# Patient Record
Sex: MALE | Race: OTHER | HISPANIC OR LATINO | Employment: FULL TIME | URBAN - METROPOLITAN AREA
[De-identification: names, ages, dates, MRNs, and addresses within clinical notes are randomized per-mention and may not be internally consistent; named-entity substitution may affect disease eponyms.]

---

## 2024-03-27 ENCOUNTER — TELEPHONE (OUTPATIENT)
Dept: NEUROLOGY | Facility: CLINIC | Age: 44
End: 2024-03-27

## 2024-03-27 NOTE — TELEPHONE ENCOUNTER
Spoke to pt, I r/s today's visit due to provider being out sick. New appoint 5/1 @ 3:30. He was placed on the wait list for any afternoon openings.

## 2024-03-27 NOTE — TELEPHONE ENCOUNTER
[facility]:    YARITZA  1/29/24  NUMBNESS AND TINGLING IN LEFT ARM/STROKE LIKE SYMPTOMS    Rescheduled patient with [facility] in [place] office 4/30/24 added to wait list      Per notes:    Recommend outpatient follow in 4-6 weeks with general or neurovascular Attending AP or resident.         Patient's significant other called was upset and stated that she was not happy that patient was rescheduled, patient has waited for a while for an appointment and wanted something sooner.  I offered new appt and she was pleased.

## 2024-03-29 DIAGNOSIS — I10 HYPERTENSION, UNSPECIFIED TYPE: ICD-10-CM

## 2024-03-29 RX ORDER — LISINOPRIL 10 MG/1
10 TABLET ORAL DAILY
Qty: 30 TABLET | Refills: 2 | Status: SHIPPED | OUTPATIENT
Start: 2024-03-29

## 2024-04-01 DIAGNOSIS — I10 HYPERTENSION, UNSPECIFIED TYPE: ICD-10-CM

## 2024-04-01 RX ORDER — LISINOPRIL 10 MG/1
10 TABLET ORAL DAILY
Qty: 30 TABLET | Refills: 2 | OUTPATIENT
Start: 2024-04-01

## 2024-04-02 ENCOUNTER — HOSPITAL ENCOUNTER (OUTPATIENT)
Dept: NEUROLOGY | Facility: CLINIC | Age: 44
Discharge: HOME | End: 2024-04-02
Payer: COMMERCIAL

## 2024-04-02 DIAGNOSIS — G62.9 POLYNEUROPATHY: Primary | ICD-10-CM

## 2024-04-02 DIAGNOSIS — G62.9 NEUROPATHY: ICD-10-CM

## 2024-04-02 PROCEDURE — 95910 NRV CNDJ TEST 7-8 STUDIES: CPT | Performed by: PSYCHIATRY & NEUROLOGY

## 2024-04-02 PROCEDURE — 95886 MUSC TEST DONE W/N TEST COMP: CPT | Performed by: PSYCHIATRY & NEUROLOGY

## 2024-04-06 ENCOUNTER — APPOINTMENT (OUTPATIENT)
Dept: LAB | Facility: HOSPITAL | Age: 44
End: 2024-04-06
Payer: COMMERCIAL

## 2024-04-06 DIAGNOSIS — G62.9 POLYNEUROPATHY: Primary | ICD-10-CM

## 2024-04-06 LAB
LDH SERPL-CCNC: 129 U/L (ref 140–271)
URATE SERPL-MCNC: 6 MG/DL (ref 3.5–8.5)

## 2024-04-06 PROCEDURE — 84550 ASSAY OF BLOOD/URIC ACID: CPT

## 2024-04-06 PROCEDURE — 36415 COLL VENOUS BLD VENIPUNCTURE: CPT

## 2024-04-06 PROCEDURE — 83521 IG LIGHT CHAINS FREE EACH: CPT

## 2024-04-06 PROCEDURE — 82175 ASSAY OF ARSENIC: CPT

## 2024-04-06 PROCEDURE — 82232 ASSAY OF BETA-2 PROTEIN: CPT

## 2024-04-06 PROCEDURE — 83615 LACTATE (LD) (LDH) ENZYME: CPT

## 2024-04-06 PROCEDURE — 82300 ASSAY OF CADMIUM: CPT

## 2024-04-06 PROCEDURE — 83655 ASSAY OF LEAD: CPT

## 2024-04-06 PROCEDURE — 83825 ASSAY OF MERCURY: CPT

## 2024-04-06 PROCEDURE — 85810 BLOOD VISCOSITY EXAMINATION: CPT

## 2024-04-08 LAB
B2 MICROGLOB SERPL-MCNC: 1.1 MG/L (ref 0.6–2.4)
KAPPA LC FREE SER-MCNC: 15.7 MG/L (ref 3.3–19.4)
KAPPA LC FREE/LAMBDA FREE SER: 0.44 {RATIO} (ref 0.26–1.65)
LAMBDA LC FREE SERPL-MCNC: 35.8 MG/L (ref 5.7–26.3)

## 2024-04-10 LAB — VISC SER: 1.7 REL.SALINE (ref 1.4–2)

## 2024-04-11 LAB
ARSENIC BLD-MCNC: 2 UG/L (ref 0–9)
CADMIUM BLD-MCNC: <0.5 UG/L (ref 0–1.2)
LEAD BLD-MCNC: <1 UG/DL (ref 0–3.4)
MERCURY BLD-MCNC: <1 UG/L (ref 0–14.9)

## 2024-04-24 ENCOUNTER — OFFICE VISIT (OUTPATIENT)
Dept: NEUROLOGY | Facility: CLINIC | Age: 44
End: 2024-04-24
Payer: COMMERCIAL

## 2024-04-24 ENCOUNTER — OFFICE VISIT (OUTPATIENT)
Dept: INTERNAL MEDICINE CLINIC | Facility: CLINIC | Age: 44
End: 2024-04-24

## 2024-04-24 VITALS
OXYGEN SATURATION: 96 % | WEIGHT: 252 LBS | BODY MASS INDEX: 36.08 KG/M2 | SYSTOLIC BLOOD PRESSURE: 131 MMHG | HEART RATE: 62 BPM | TEMPERATURE: 97.6 F | HEIGHT: 70 IN | RESPIRATION RATE: 18 BRPM | DIASTOLIC BLOOD PRESSURE: 86 MMHG

## 2024-04-24 VITALS
DIASTOLIC BLOOD PRESSURE: 90 MMHG | TEMPERATURE: 97.8 F | RESPIRATION RATE: 18 BRPM | WEIGHT: 254.3 LBS | SYSTOLIC BLOOD PRESSURE: 150 MMHG | HEIGHT: 70 IN | BODY MASS INDEX: 36.41 KG/M2 | OXYGEN SATURATION: 95 % | HEART RATE: 70 BPM

## 2024-04-24 DIAGNOSIS — R51.9 NONINTRACTABLE EPISODIC HEADACHE, UNSPECIFIED HEADACHE TYPE: ICD-10-CM

## 2024-04-24 DIAGNOSIS — G47.33 OSA (OBSTRUCTIVE SLEEP APNEA): ICD-10-CM

## 2024-04-24 DIAGNOSIS — E55.9 VITAMIN D DEFICIENCY: Primary | ICD-10-CM

## 2024-04-24 DIAGNOSIS — E66.9 OBESITY (BMI 30-39.9): ICD-10-CM

## 2024-04-24 DIAGNOSIS — G62.9 NEUROPATHY: ICD-10-CM

## 2024-04-24 DIAGNOSIS — R29.818 NEUROGENIC CLAUDICATION: Primary | ICD-10-CM

## 2024-04-24 PROCEDURE — 99215 OFFICE O/P EST HI 40 MIN: CPT | Performed by: PSYCHIATRY & NEUROLOGY

## 2024-04-24 PROCEDURE — 99214 OFFICE O/P EST MOD 30 MIN: CPT | Performed by: INTERNAL MEDICINE

## 2024-04-24 RX ORDER — GABAPENTIN 100 MG/1
100 CAPSULE ORAL 2 TIMES DAILY
Qty: 60 CAPSULE | Refills: 1 | Status: SHIPPED | OUTPATIENT
Start: 2024-04-24

## 2024-04-24 RX ORDER — METHYLPREDNISOLONE 4 MG/1
TABLET ORAL
Qty: 21 EACH | Refills: 0 | Status: SHIPPED | OUTPATIENT
Start: 2024-04-24

## 2024-04-24 RX ORDER — IBUPROFEN 400 MG/1
400 TABLET ORAL EVERY 6 HOURS PRN
Qty: 90 TABLET | Refills: 1 | Status: SHIPPED | OUTPATIENT
Start: 2024-04-24

## 2024-04-24 NOTE — PROGRESS NOTES
Name: Portillo Machado      : 1980      MRN: [id number]  Encounter Provider: Nikolas Arnold DO  Encounter Date: 2024   Encounter department: [facility]    Assessment & Plan     1. Vitamin D deficiency  -     cholecalciferol 1,000 units tablet; Take 1 tablet (1,000 Units total) by mouth daily    2. Neuropathy  -     gabapentin (NEURONTIN) 100 mg capsule; Take 1 capsule (100 mg total) by mouth 2 (two) times a day    3. Nonintractable episodic headache, unspecified headache type  -     methylPREDNISolone 4 MG tablet therapy pack; Use as directed on package  -     ibuprofen (MOTRIN) 400 mg tablet; Take 1 tablet (400 mg total) by mouth every 6 (six) hours as needed for mild pain or headaches    Neuropathy -at this point we have ruled out organic causes such as nutritional deficiencies, EMG negative, brain MRI negative, initial workup for autoimmune process negative.  Undergoing further workup by neurology including MRI L-spine.  Increase gabapentin from 100 nightly to 100 twice daily  Hypertension-continue lisinopril 10 mg daily.  Blood pressure adequately controlled today.  Headaches with right eye pain -differential includes migraine versus cluster headache however patient denies symptoms of rhinorrhea, watery eyes.  We will trial a Medrol Dosepak as well as prescribed NSAIDs as needed  Vitamin D deficiency -status post catch-up therapy with vitamin D2 50,000 units/week.  Switch over to vitamin D3 1000 units daily.  Follow-up in 2 to 3 months after MRI L-spine and visit with neurology     Subjective     43-year-old with VENKAT, obesity, right ankle stretch fractures in , history of hypertension presenting for a 8-week follow-up.    Summary of last visit with updates to HPI: Has been experiencing burning pain and needle sensation of bilateral legs and feet for the past 6 months.  Symptoms are worse with physical exertion.  More recently this has started to  include his bilateral upper extremities mainly his hands.  Thorough workup was performed including EMG, testing for toxic heavy metals, autoimmune screening, vitamin levels.  Workup has resulted in minor increases in ESR 17, CRP 4.5, low vitamin D 9.4.  Also had MRI brain and EMG which were both within normal limits.    Patient continues to endorse similar symptoms at this visit.  New symptoms at this time include Right eye pain and some vision changes as well with long distance vision   This has been going on for 3 works. Does not wear contacts. Symptoms are worse with bright lights. Symptoms can occur randomly through out the day. BAUGH also strated about three weeks. They are right sided extending from the right temple to the right occiput.  Denies rhinorrhea or excessive tearing. Denies N/V. No additional focal sx     Patient seen by neurology today and a MRI lumbar spine was ordered to rule out a neurogenic cause of his symptoms.    Patient was prescribed gabapentin 100 mg at bedtime and states that this does help his symptoms at nighttime and he is able to sleep throughout the night.  He is agreeable to try this medication during the day.  We will upgrade his gabapentin from 100 mg nightly to 100 mg twice daily.  Denies operating heavy machinery at work.    Review of Systems   Constitutional:  Negative for chills and fever.   HENT:  Negative for ear pain and sore throat.    Eyes:  Positive for photophobia, pain and visual disturbance. Negative for redness.   Respiratory:  Negative for cough and shortness of breath.    Cardiovascular:  Negative for chest pain and palpitations.   Gastrointestinal:  Negative for abdominal pain and vomiting.   Genitourinary:  Negative for dysuria and hematuria.   Musculoskeletal:  Negative for arthralgias and back pain.   Skin:  Negative for color change and rash.   Neurological:  Positive for headaches. Negative for seizures and syncope.   All other systems reviewed and are  "negative.      Current Outpatient Medications on File Prior to Visit   Medication Sig    Diclofenac Sodium (VOLTAREN) 1 % Apply 2 g topically 4 (four) times a day (Patient not taking: Reported on 4/24/2024)    ergocalciferol (VITAMIN D2) 50,000 units Take 1 capsule (50,000 Units total) by mouth once a week for 8 doses    lisinopril (ZESTRIL) 10 mg tablet TAKE 1 TABLET(10 MG) BY MOUTH DAILY    pravastatin (PRAVACHOL) 20 mg tablet Take 1 tablet (20 mg total) by mouth daily    vitamin B-12 (VITAMIN B-12) 1,000 mcg tablet Take 1 tablet (1,000 mcg total) by mouth daily    [DISCONTINUED] gabapentin (NEURONTIN) 100 mg capsule Take 1 capsule (100 mg total) by mouth daily at bedtime       Objective     /86   Pulse 62   Temp 97.6 °F (36.4 °C) (Temporal)   Resp 18   Ht 5' 10\" (1.778 m)   Wt 114 kg (252 lb)   SpO2 96%   BMI 36.16 kg/m²     Physical Exam  Constitutional:       Appearance: He is obese.   HENT:      Head: Normocephalic and atraumatic.      Mouth/Throat:      Mouth: Mucous membranes are moist.   Eyes:      Extraocular Movements: Extraocular movements intact.      Pupils: Pupils are equal, round, and reactive to light.   Cardiovascular:      Rate and Rhythm: Normal rate and regular rhythm.      Pulses: Normal pulses.   Abdominal:      General: Bowel sounds are normal. There is no distension.      Palpations: Abdomen is soft.      Tenderness: There is no abdominal tenderness.   Musculoskeletal:      Right lower leg: No edema.      Left lower leg: No edema.   Skin:     General: Skin is warm.      Findings: No rash.   Neurological:      Mental Status: He is alert and oriented to person, place, and time.      Motor: No weakness.       Nikolas Arnold,     "

## 2024-04-24 NOTE — ASSESSMENT & PLAN NOTE
Patient with history of concussion with post traumatic headache, hypertension, R ankle fracture, Hyperlipidemia, VENKAT (Not on CPAP), fracture of the left radius, venous insufficiency and excessive daytime sleepiness who presents for evaluation of neuropathy.    Patient reports reports that symptom onset was 2 years ago.  It started with his medial right leg and was told it was a bony abnormality.  He had an AFO placed which helped improve symptoms.  After a year he started to experience paresthesias of both feet.  He reports having a heavy pain on the medial side of his distal right lower extremity and on his lateral side of his left lower extremity.  This symptom ascends up to below the knee.  Symptoms are worse with standing and walking long distances.  In the mornings he has significant difficulty putting his feet on the ground because that is when he feels the pain the most.  At bedtime he mostly feels a stinging like sensation in his extremities.  In the past patient used to smoke for 15 years 1 pack/day but quit.    On physical exam today patient with decreased strength at bilateral hamstrings and right plantar extension.  He does have decreased range of movement at the ankles.  He has multiple sensory abnormalities with decreased vibration sense at the toes bilaterally and mid shin changing temperature sensation and diminished pinprick sensation of the right foot below the ankle.  Patient with bilateral suprapatellar reflexes present and a right cross adductor present.  Patient has an antalgic gait.  Available workup thus far including an EMG of bilateral lower extremities and a BALs were normal.    At this time I believe that patient likely has neurogenic claudication from lumbar spinal stenosis given his history, weight and exam.  I will do an MRI of the L-spine to evaluate for th.is.  Pending results will refer to neurosurgery.  Referral also placed for weight management to help with weight loss.  I advised  patient to pursue obtaining a CPAP machine as this could contribute to weight gain if left untreated.  Follow-up after MRI results

## 2024-04-24 NOTE — PROGRESS NOTES
Assessment/Plan:    No problem-specific Assessment & Plan notes found for this encounter.       {Assess/PlanSmartLinks:10896}      Subjective:      Patient ID: Portillo Machado is a 43 y.o. male.    HPI    {Common ambulatory SmartLinks:81860}    Review of Systems      Objective:      There were no vitals taken for this visit.         Physical Exam

## 2024-04-24 NOTE — PROGRESS NOTES
Patient ID: Portillo Machado is a 43 y.o. male.    Assessment/Plan:    Neurogenic claudication  Patient with history of concussion with post traumatic headache, hypertension, R ankle fracture, Hyperlipidemia, VENKAT (Not on CPAP), fracture of the left radius, venous insufficiency and excessive daytime sleepiness who presents for evaluation of neuropathy.    Patient reports reports that symptom onset was 2 years ago.  It started with his medial right leg and was told it was a bony abnormality.  He had an AFO placed which helped improve symptoms.  After a year he started to experience paresthesias of both feet.  He reports having a heavy pain on the medial side of his distal right lower extremity and on his lateral side of his left lower extremity.  This symptom ascends up to below the knee.  Symptoms are worse with standing and walking long distances.  In the mornings he has significant difficulty putting his feet on the ground because that is when he feels the pain the most.  At bedtime he mostly feels a stinging like sensation in his extremities.  In the past patient used to smoke for 15 years 1 pack/day but quit.    On physical exam today patient with decreased strength at bilateral hamstrings and right plantar extension.  He does have decreased range of movement at the ankles.  He has multiple sensory abnormalities with decreased vibration sense at the toes bilaterally and mid shin changing temperature sensation and diminished pinprick sensation of the right foot below the ankle.  Patient with bilateral suprapatellar reflexes present and a right cross adductor present.  Patient has an antalgic gait.  Available workup thus far including an EMG of bilateral lower extremities and a BALs were normal.    At this time I believe that patient likely has neurogenic claudication from lumbar spinal stenosis given his history, weight and exam.  I will do an MRI of the L-spine to evaluate for th.is.  Pending results will  refer to neurosurgery.  Referral also placed for weight management to help with weight loss.  I advised patient to pursue obtaining a CPAP machine as this could contribute to weight gain if left untreated.  Follow-up after MRI results         Diagnoses and all orders for this visit:    Neurogenic claudication  -     MRI lumbar spine without contrast; Future  -     Ambulatory Referral to Weight Management; Future    Obesity (BMI 30-39.9)  -     Ambulatory Referral to Weight Management; Future    VENKAT (obstructive sleep apnea)       Subjective:    HPI    Patient with history of concussion with post traumatic headache, hypertension, R ankle fracture, Hyperlipidemia, VENKAT (Not on CPAP), fracture of the left radius, venous insufficiency and excessive daytime sleepiness who presents for evaluation of neuropathy    Patient presents for evaluation of neuropathy  Started 2 months ago. Sx started with the right leg, was told it was a bony abnormality. Had a boot then improved. Then a year after both legs occurs  Symmetric both feet   Top of feet, R medial side and L foot lateral side ascends up to below the knee  Reports a heavy pain, at night is a sting   Worse with walking long distances.   No inciting event   Has leg swelling   When he wakes up in the morning he has difficulty putting his feet on the ground cause that is when he feels the pain most   Feels weakness of the legs       Quit smoking. Smoked for 15 years 1 PPD     Headaches:  Started 3 weeks ago   Usually right retro orbital and radiates posteriorly   Lasts 10 min- 12 H   Has muscle neck tension.   A few weeks ago had a left occipital pain with anterior L throat pain.         Workup:  EMG 4/2/24: normal     VAS 2022:  CONCLUSION:  Impression:  RIGHT LOWER LIMB:  No evidence of significant lower extremity arterial occlusive disease.  Ankle/Brachial index:  1.13 within normal limits  PVR/ PPG tracings are normal.  Metatarsal pressure of 156 mmHg  Great toe pressure  "of 133 mmHg, within the healing range        LEFT LOWER LIMB:  No evidence of significant lower extremity arterial occlusive disease.  Ankle/Brachial index:   1.11 within normal limits  PVR/ PPG tracings are normal.  Metatarsal pressure of 155 mmHg  Great toe pressure of 169 mmHg, within the healing range          The following portions of the patient's history were reviewed and updated as appropriate: allergies, current medications, past family history, past medical history, past social history, past surgical history, and problem list.         Objective:    Blood pressure 150/90, pulse 70, temperature 97.8 °F (36.6 °C), temperature source Temporal, resp. rate 18, height 5' 10\" (1.778 m), weight 115 kg (254 lb 4.8 oz), SpO2 95%.    Physical Exam  Constitutional:       General: He is awake.   Eyes:      General: Lids are normal.      Extraocular Movements: Extraocular movements intact.   Neurological:      Coordination: Romberg sign negative.      Deep Tendon Reflexes:      Reflex Scores:       Bicep reflexes are 2+ on the right side and 2+ on the left side.       Brachioradialis reflexes are 2+ on the right side and 2+ on the left side.       Patellar reflexes are 2+ on the right side and 2+ on the left side.       Achilles reflexes are 1+ on the right side and 1+ on the left side.  Psychiatric:         Speech: Speech normal.         Neurological Exam  Mental Status  Awake. Speech is normal. Language is fluent with no aphasia. Attention and concentration are normal.    Cranial Nerves  CN III, IV, VI: Extraocular movements intact bilaterally. Normal lids and orbits bilaterally.  CN VII: Full and symmetric facial movement.  CN XII: Tongue midline without atrophy or fasciculations.  Right sensorineural hearing loss .    Motor  Increased muscle bulk throughout. No fasciculations present. Normal muscle tone. No abnormal involuntary movements. Strength is 5/5 in all four extremities except as noted.  Decreased ROM " at the ankle   4+/5 bilateral hamstring  4+/5 Right plantar extension .    Sensory  Pinprick abnormality: Temperature abnormality: Vibration abnormality:   Normal proprioception at the toes bilaterally   Diminished pinprick sensation Right foot below ankle   Mid shin change in temp sensation   Decreased vibraiton sense at toes bilaterally   .    Reflexes                                            Right                      Left  Brachioradialis                    2+                         2+  Biceps                                 2+                         2+  Patellar                                2+                         2+  Achilles                                1+                         1+  Right Plantar: downgoing  Left Plantar: downgoing    Right pathological reflexes: Javi's absent. Suprapatellar present. Crossed adductor present. Ankle clonus absent.  Left pathological reflexes: Javi's absent. Suprapatellar present. Crossed adductor absent. Ankle clonus absent.  Brisk patellars.    Gait  Casual gait: Antalgic gait. Romberg is absent.        ROS:    Review of Systems  Review of Systems   Constitutional:  Negative for appetite change, fatigue and fever.   HENT: Negative.  Negative for hearing loss, tinnitus, trouble swallowing and voice change.    Eyes:  Positive for pain (right eye). Negative for photophobia and visual disturbance.   Respiratory: Negative.  Negative for shortness of breath.    Cardiovascular: Negative.  Negative for palpitations.   Gastrointestinal: Negative.  Negative for nausea and vomiting.   Endocrine: Negative.  Negative for cold intolerance.   Genitourinary: Negative.  Negative for dysuria, frequency and urgency.   Musculoskeletal:  Positive for gait problem (due to leg numbness). Negative for back pain, myalgias, neck pain and neck stiffness.   Skin: Negative.  Negative for rash.   Allergic/Immunologic: Negative.    Neurological:  Positive for weakness (BL legs),  numbness and headaches (right side - daily/constant). Negative for dizziness, tremors, seizures, syncope, facial asymmetry, speech difficulty and light-headedness (BL legs - pain/tingling at times and a burning sensation).        A lot of cramping on BL legs    Hematological: Negative.  Does not bruise/bleed easily.   Psychiatric/Behavioral: Negative.  Negative for confusion, hallucinations and sleep disturbance.    All other systems reviewed and are negative.

## 2024-05-03 ENCOUNTER — HOSPITAL ENCOUNTER (OUTPATIENT)
Dept: MRI IMAGING | Facility: HOSPITAL | Age: 44
Discharge: HOME | End: 2024-05-03
Attending: PSYCHIATRY & NEUROLOGY
Payer: COMMERCIAL

## 2024-05-03 DIAGNOSIS — R29.818 NEUROGENIC CLAUDICATION: ICD-10-CM

## 2024-05-03 PROCEDURE — 72148 MRI LUMBAR SPINE W/O DYE: CPT

## 2024-05-22 DIAGNOSIS — R29.818 NEUROGENIC CLAUDICATION: Primary | ICD-10-CM

## 2024-05-22 DIAGNOSIS — M43.17 SPONDYLOLISTHESIS AT L5-S1 LEVEL: ICD-10-CM

## 2024-06-04 ENCOUNTER — HOSPITAL ENCOUNTER (EMERGENCY)
Facility: HOSPITAL | Age: 44
Discharge: HOME | End: 2024-06-04
Attending: EMERGENCY MEDICINE
Payer: COMMERCIAL

## 2024-06-04 VITALS
OXYGEN SATURATION: 98 % | HEIGHT: 70 IN | BODY MASS INDEX: 35.66 KG/M2 | DIASTOLIC BLOOD PRESSURE: 70 MMHG | RESPIRATION RATE: 14 BRPM | TEMPERATURE: 97.2 F | SYSTOLIC BLOOD PRESSURE: 127 MMHG | WEIGHT: 249.12 LBS | HEART RATE: 71 BPM

## 2024-06-04 DIAGNOSIS — R51.9 HEADACHE: Primary | ICD-10-CM

## 2024-06-04 PROCEDURE — 99284 EMERGENCY DEPT VISIT MOD MDM: CPT

## 2024-06-04 PROCEDURE — 99283 EMERGENCY DEPT VISIT LOW MDM: CPT

## 2024-06-04 RX ORDER — METOCLOPRAMIDE 10 MG/1
10 TABLET ORAL ONCE
Status: COMPLETED | OUTPATIENT
Start: 2024-06-04 | End: 2024-06-04

## 2024-06-04 RX ORDER — ACETAMINOPHEN 500 MG
500 TABLET ORAL EVERY 6 HOURS PRN
Qty: 30 TABLET | Refills: 0 | Status: SHIPPED | OUTPATIENT
Start: 2024-06-04

## 2024-06-04 RX ORDER — IBUPROFEN 600 MG/1
600 TABLET ORAL ONCE
Status: COMPLETED | OUTPATIENT
Start: 2024-06-04 | End: 2024-06-04

## 2024-06-04 RX ORDER — IBUPROFEN 800 MG/1
800 TABLET ORAL 3 TIMES DAILY
Qty: 21 TABLET | Refills: 0 | Status: SHIPPED | OUTPATIENT
Start: 2024-06-04

## 2024-06-04 RX ORDER — ACETAMINOPHEN 325 MG/1
975 TABLET ORAL ONCE
Status: COMPLETED | OUTPATIENT
Start: 2024-06-04 | End: 2024-06-04

## 2024-06-04 RX ORDER — KETOROLAC TROMETHAMINE 10 MG/1
10 TABLET, FILM COATED ORAL ONCE
Status: DISCONTINUED | OUTPATIENT
Start: 2024-06-04 | End: 2024-06-04

## 2024-06-04 RX ADMIN — IBUPROFEN 600 MG: 600 TABLET, FILM COATED ORAL at 11:09

## 2024-06-04 RX ADMIN — ACETAMINOPHEN 325MG 975 MG: 325 TABLET ORAL at 11:09

## 2024-06-04 RX ADMIN — METOCLOPRAMIDE 10 MG: 10 TABLET ORAL at 11:09

## 2024-06-04 NOTE — ED PROVIDER NOTES
History  Chief Complaint   Patient presents with    Headache     Patient reports right sided posterior pain x 3 weeks, denies head injury or migraine like symptoms     Patient is a 43-year-old male presents to the emergency department with right-sided posterior head pain that started 3 weeks ago.  Patient denies any head injury or history of trauma.  Patient states the pain is a throbbing pain that starts at the base of the right side of his head and travels forward to behind the eye.  Describes the pain as pulsatile but denies any obvious visual changes prior to headache onset.  Patient states that the pain has been waxing and waning for the past 3 weeks stating that the pain increases and decreases throughout the day.  Patient has taken over-the-counter Tylenol and ibuprofen for pain.  Patient states that the pain does improve with OTC pain medications but reoccurs after 1 to 2 hours after taking medication.  Patient denies any photophobia or phonophobia.  Denies any fever, body aches, chills, sinus congestion, weakness, double vision, numbness, or paresthesias.        Prior to Admission Medications   Prescriptions Last Dose Informant Patient Reported? Taking?   Diclofenac Sodium (VOLTAREN) 1 %  Self No No   Sig: Apply 2 g topically 4 (four) times a day   Patient not taking: Reported on 4/24/2024   cholecalciferol 1,000 units tablet   No No   Sig: Take 1 tablet (1,000 Units total) by mouth daily   ergocalciferol (VITAMIN D2) 50,000 units  Self No No   Sig: Take 1 capsule (50,000 Units total) by mouth once a week for 8 doses   gabapentin (NEURONTIN) 100 mg capsule   No No   Sig: Take 1 capsule (100 mg total) by mouth 2 (two) times a day   ibuprofen (MOTRIN) 400 mg tablet   No No   Sig: Take 1 tablet (400 mg total) by mouth every 6 (six) hours as needed for mild pain or headaches   lisinopril (ZESTRIL) 10 mg tablet  Self No No   Sig: TAKE 1 TABLET(10 MG) BY MOUTH DAILY   methylPREDNISolone 4 MG tablet therapy pack    No No   Sig: Use as directed on package   pravastatin (PRAVACHOL) 20 mg tablet  Self No No   Sig: Take 1 tablet (20 mg total) by mouth daily   vitamin B-12 (VITAMIN B-12) 1,000 mcg tablet  Self No No   Sig: Take 1 tablet (1,000 mcg total) by mouth daily      Facility-Administered Medications: None       Past Medical History:   Diagnosis Date    Concussion     No known problems        History reviewed. No pertinent surgical history.    Family History   Problem Relation Age of Onset    Hypertension Mother     No Known Problems Father     No Known Problems Sister     No Known Problems Brother     No Known Problems Son     No Known Problems Daughter      I have reviewed and agree with the history as documented.    E-Cigarette/Vaping    E-Cigarette Use Never User      E-Cigarette/Vaping Substances    Nicotine No     THC No     CBD No     Flavoring No     Other No     Unknown No      Social History     Tobacco Use    Smoking status: Former     Current packs/day: 0.00     Average packs/day: 1 pack/day for 15.0 years (15.0 ttl pk-yrs)     Types: Cigarettes     Start date:      Quit date: 2017     Years since quittin.4    Smokeless tobacco: Never   Vaping Use    Vaping status: Never Used   Substance Use Topics    Alcohol use: Yes     Comment: weekends only and half a pack    Drug use: No       Review of Systems   Constitutional:  Negative for activity change, appetite change, chills, diaphoresis, fatigue and fever.   HENT:  Negative for congestion, dental problem, ear discharge, ear pain, hearing loss, mouth sores, postnasal drip, rhinorrhea, sinus pressure, sinus pain, sneezing and sore throat.    Eyes:  Negative for pain, discharge, redness and itching.   Respiratory:  Negative for cough, choking, chest tightness, shortness of breath, wheezing and stridor.    Cardiovascular:  Negative for chest pain, palpitations and leg swelling.   Gastrointestinal:  Negative for abdominal pain, constipation, diarrhea, nausea and  vomiting.   Musculoskeletal:  Negative for arthralgias, back pain, gait problem, joint swelling, myalgias, neck pain and neck stiffness.   Skin:  Negative for color change, pallor, rash and wound.   Neurological:  Positive for headaches. Negative for dizziness, tremors, seizures, syncope, facial asymmetry, speech difficulty, weakness, light-headedness and numbness.       Physical Exam  Physical Exam  Constitutional:       General: He is not in acute distress.     Appearance: Normal appearance. He is not ill-appearing, toxic-appearing or diaphoretic.   HENT:      Head: Normocephalic and atraumatic.      Right Ear: Tympanic membrane, ear canal and external ear normal. There is no impacted cerumen.      Left Ear: Tympanic membrane, ear canal and external ear normal. There is no impacted cerumen.      Nose: Nose normal. No congestion or rhinorrhea.      Mouth/Throat:      Pharynx: No oropharyngeal exudate or posterior oropharyngeal erythema.   Eyes:      General: No scleral icterus.        Right eye: No discharge.         Left eye: No discharge.      Extraocular Movements: Extraocular movements intact.      Pupils: Pupils are equal, round, and reactive to light.   Neck:      Vascular: No carotid bruit.   Cardiovascular:      Rate and Rhythm: Normal rate and regular rhythm.      Heart sounds: No murmur heard.     No friction rub. No gallop.   Pulmonary:      Effort: Pulmonary effort is normal. No respiratory distress.      Breath sounds: Normal breath sounds. No stridor. No wheezing, rhonchi or rales.   Chest:      Chest wall: No tenderness.   Abdominal:      General: Abdomen is flat. There is no distension.      Palpations: Abdomen is soft.      Tenderness: There is no abdominal tenderness. There is no right CVA tenderness or guarding.   Musculoskeletal:         General: No swelling, tenderness, deformity or signs of injury.      Cervical back: No rigidity or tenderness.      Right lower leg: No edema.      Left lower  leg: No edema.   Lymphadenopathy:      Cervical: No cervical adenopathy.   Skin:     General: Skin is warm and dry.      Capillary Refill: Capillary refill takes less than 2 seconds.      Coloration: Skin is not jaundiced or pale.      Findings: No bruising, erythema, lesion or rash.   Neurological:      General: No focal deficit present.      Mental Status: He is alert and oriented to person, place, and time.      Cranial Nerves: No cranial nerve deficit.      Sensory: No sensory deficit.      Motor: No weakness.      Coordination: Coordination normal.      Gait: Gait normal.         Vital Signs  ED Triage Vitals [06/04/24 1026]   Temperature Pulse Respirations Blood Pressure SpO2   (!) 97.2 °F (36.2 °C) 71 14 127/70 98 %      Temp Source Heart Rate Source Patient Position - Orthostatic VS BP Location FiO2 (%)   Temporal -- -- -- --      Pain Score       6           Vitals:    06/04/24 1026   BP: 127/70   Pulse: 71         Visual Acuity  Visual Acuity      Flowsheet Row Most Recent Value   L Pupil Size (mm) 4   R Pupil Size (mm) 4            ED Medications  Medications   metoclopramide (REGLAN) tablet 10 mg (10 mg Oral Given 6/4/24 1109)   acetaminophen (TYLENOL) tablet 975 mg (975 mg Oral Given 6/4/24 1109)   ibuprofen (MOTRIN) tablet 600 mg (600 mg Oral Given 6/4/24 1109)       Diagnostic Studies  Results Reviewed       None                   No orders to display              Procedures  Procedures         ED Course                               SBIRT 20yo+      Flowsheet Row Most Recent Value   Initial Alcohol Screen: US AUDIT-C     1. How often do you have a drink containing alcohol? 0 Filed at: 06/04/2024 1025   2. How many drinks containing alcohol do you have on a typical day you are drinking?  0 Filed at: 06/04/2024 1025   3a. Male UNDER 65: How often do you have five or more drinks on one occasion? 0 Filed at: 06/04/2024 1025   3b. FEMALE Any Age, or MALE 65+: How often do you have 4 or more drinks on one  occassion? 0 Filed at: 06/04/2024 1025   Audit-C Score 0 Filed at: 06/04/2024 1025   SAMMIE: How many times in the past year have you...    Used an illegal drug or used a prescription medication for non-medical reasons? Never Filed at: 06/04/2024 1025                      Medical Decision Making  Patient is a 43-year-old male presents to the emergency department with right-sided posterior head pain that started 3 weeks ago.  Patient denies any head injury or history of trauma.  Patient states the pain is a throbbing pain that starts at the base of the right side of his head and travels forward to behind the eye.  Describes the pain as pulsatile but denies any obvious visual changes prior to headache onset.  Patient states that the pain has been waxing and waning for the past 3 weeks stating that the pain increases and decreases throughout the day. Physical exam showed no weakness, sensory, cranial nerve deficit, patient AO X3.  Discussed with patient about potential for Toradol for pain management and abortive therapy for headache shared decision making patient was afraid of needles discussed changing to p.o. formulation of the medication such as Tylenol and Motrin.  Upon administration of medication patient had total improvement of pain on right side of head without relapse of pain.  Discussed with patient about following up with neurology/PCP for further management of migraine headaches.  Patient agrees with current plan.    Risk  OTC drugs.  Prescription drug management.             Disposition  Final diagnoses:   Headache     Time reflects when diagnosis was documented in both MDM as applicable and the Disposition within this note       Time User Action Codes Description Comment    6/4/2024 11:23 AM Bjorn Sylvester Add [R51.9] Headache           ED Disposition       ED Disposition   Discharge    Condition   Stable    Date/Time   Tue Jun 4, 2024 1130    Comment   Efra Machado discharge to home/self  care.                   Follow-up Information    None         Discharge Medication List as of 6/4/2024 11:31 AM        START taking these medications    Details   acetaminophen (TYLENOL) 500 mg tablet Take 1 tablet (500 mg total) by mouth every 6 (six) hours as needed for mild pain, Starting Tue 6/4/2024, Normal      !! ibuprofen (MOTRIN) 800 mg tablet Take 1 tablet (800 mg total) by mouth 3 (three) times a day, Starting Tue 6/4/2024, Normal       !! - Potential duplicate medications found. Please discuss with provider.        CONTINUE these medications which have NOT CHANGED    Details   cholecalciferol 1,000 units tablet Take 1 tablet (1,000 Units total) by mouth daily, Starting Wed 4/24/2024, Normal      Diclofenac Sodium (VOLTAREN) 1 % Apply 2 g topically 4 (four) times a day, Starting Fri 2/16/2024, Normal      ergocalciferol (VITAMIN D2) 50,000 units Take 1 capsule (50,000 Units total) by mouth once a week for 8 doses, Starting Mon 2/26/2024, Until Wed 4/24/2024, Normal      gabapentin (NEURONTIN) 100 mg capsule Take 1 capsule (100 mg total) by mouth 2 (two) times a day, Starting Wed 4/24/2024, Normal      !! ibuprofen (MOTRIN) 400 mg tablet Take 1 tablet (400 mg total) by mouth every 6 (six) hours as needed for mild pain or headaches, Starting Wed 4/24/2024, Normal      lisinopril (ZESTRIL) 10 mg tablet TAKE 1 TABLET(10 MG) BY MOUTH DAILY, Starting Fri 3/29/2024, Normal      methylPREDNISolone 4 MG tablet therapy pack Use as directed on package, Normal      pravastatin (PRAVACHOL) 20 mg tablet Take 1 tablet (20 mg total) by mouth daily, Starting Mon 2/26/2024, Normal      vitamin B-12 (VITAMIN B-12) 1,000 mcg tablet Take 1 tablet (1,000 mcg total) by mouth daily, Starting Wed 3/6/2024, Until Tue 6/4/2024, Normal       !! - Potential duplicate medications found. Please discuss with provider.              PDMP Review       None            ED Provider  Electronically Signed by             Bjorn Sylvester  MINDA  06/04/24 2615

## 2024-06-04 NOTE — DISCHARGE INSTRUCTIONS
Medication as prescribed  Avoid inciting triggers of headache  Follow-up with neurology in outpatient setting for further management of migraines/headaches.  Return to emergency department if headache symptoms worsen or new symptoms develop such as sided weakness, slurring of speech.

## 2024-06-05 ENCOUNTER — OFFICE VISIT (OUTPATIENT)
Dept: INTERNAL MEDICINE CLINIC | Facility: CLINIC | Age: 44
End: 2024-06-05

## 2024-06-05 VITALS
HEART RATE: 76 BPM | BODY MASS INDEX: 35.76 KG/M2 | WEIGHT: 249.2 LBS | SYSTOLIC BLOOD PRESSURE: 136 MMHG | TEMPERATURE: 98.6 F | OXYGEN SATURATION: 98 % | DIASTOLIC BLOOD PRESSURE: 88 MMHG

## 2024-06-05 DIAGNOSIS — G44.011 INTRACTABLE EPISODIC CLUSTER HEADACHE: Primary | ICD-10-CM

## 2024-06-05 DIAGNOSIS — R51.9 NONINTRACTABLE EPISODIC HEADACHE, UNSPECIFIED HEADACHE TYPE: ICD-10-CM

## 2024-06-05 DIAGNOSIS — M62.838 NECK MUSCLE SPASM: ICD-10-CM

## 2024-06-05 PROCEDURE — 99213 OFFICE O/P EST LOW 20 MIN: CPT | Performed by: INTERNAL MEDICINE

## 2024-06-05 RX ORDER — METHYLPREDNISOLONE 4 MG/1
TABLET ORAL
Qty: 21 EACH | Refills: 0 | Status: SHIPPED | OUTPATIENT
Start: 2024-06-05

## 2024-06-05 RX ORDER — SUMATRIPTAN 20 MG/1
1 SPRAY NASAL ONCE AS NEEDED
Qty: 6 EACH | Refills: 0 | Status: SHIPPED | OUTPATIENT
Start: 2024-06-05

## 2024-06-05 RX ORDER — VERAPAMIL HYDROCHLORIDE 80 MG/1
80 TABLET ORAL 3 TIMES DAILY
Qty: 180 TABLET | Refills: 0 | Status: SHIPPED | OUTPATIENT
Start: 2024-06-05 | End: 2024-08-04

## 2024-06-05 RX ORDER — METHOCARBAMOL 500 MG/1
500 TABLET, FILM COATED ORAL 2 TIMES DAILY PRN
Qty: 28 TABLET | Refills: 0 | Status: SHIPPED | OUTPATIENT
Start: 2024-06-05

## 2024-06-05 NOTE — PATIENT INSTRUCTIONS
Isatu mitad de la cafe y desayune   Espray intranasal sumatriptan -- usar solo cuando tiene dolor mike en la natriz izquierda (otro lado del dolor de jonathan)  Verapamil 80 mg steven veces al kenn  Medrol dose pack (steroid)  Robaxin para relajante muscular por la noche  Nos vemos en 4 semanas

## 2024-06-05 NOTE — PROGRESS NOTES
INTERNAL MEDICINE OFFICE VISIT  [address] [address]    NAME: Efra Machado  AGE: 43 y.o. SEX: male    DATE OF ENCOUNTER: 6/5/2024    Assessment and Plan     1. Nonintractable episodic headache, unspecified headache type  Cluster headache ipsilateral R sided every time -- lasts 20-60 min. Last time, forgot that he was prescribed medrol and never tried it.   - methylPREDNISolone 4 MG tablet therapy pack; Use as directed on package  Dispense: 21 each; Refill: 0    2. Intractable episodic cluster headache  Start verapamil 80 mg TID, stop lisinopril. Discussed using sumatriptan intranasally only if intractable headache -- use on  nostril contralateral to headache.   MRI brain WNL. Prior imaging normal. 2018 did have a headstrike but no imaging changes.   3 weeks of headache in April 2024 and May/June 2024 each.   Follow up with neurology on 6/24.  - verapamil (CALAN) 80 mg tablet; Take 1 tablet (80 mg total) by mouth 3 (three) times a day  Dispense: 180 tablet; Refill: 0  - SUMAtriptan (IMITREX) 20 MG/ACT nasal spray; 1 spray (20 mg total) into each nostril once as needed for migraine for up to 6 doses ; if headache returns/persists, may repeat dose once after 2 hours; MAX 40 mg/24 hours  Dispense: 6 each; Refill: 0    3. Neck muscle spasm  Occiput with point tenderness on R near nape of neck. Trial robaxin 500 mg at night PRN. Watch for drowsiness  - methocarbamol (ROBAXIN) 500 mg tablet; Take 1 tablet (500 mg total) by mouth 2 (two) times a day as needed for muscle spasms Por la noche  Dispense: 28 tablet; Refill: 0    No orders of the defined types were placed in this encounter.    Chief Complaint     Chief Complaint   Patient presents with   • Headache     Right side only, 3 weeks, comes and goes throughout the day,       History of Present Illness     HPI  4/2024 - he had pain in R eye and had some vision changes at that time. MRI brain and EMG were normal. He  "saw ophthalmologist 2 weeks ago, and had some worsening vision, and is waiting for new glasses. The headache was gone therafter.     Now, 3 weeks ago, started a new headahce that was diffferent starting from the nape of his neck near right occiput sharp pain that radiated from right eye brow. The headache can last 20 min - 1 hour. 3-4x a day. Most of the pain when he wakes up in the morning. He took aspirin from ED which helped him. Yesterday he woke up fine but while elaving the house later morning, the pains started again. In AM, around 6 am when pain started -- he tries to eat breakfast and eat 2 advils -- which relieves the pain for 2-3 hours then pain comes back. When it comes back, he tries to tough through the pain without meds. He has not tried tylenol. He endorses numbness on R scalp when pain starts up, no parasthesia, no crying from one eye. Ear sometimes feels blocked on some occasions -- he heard tinnitus \"beep\" only R ear once. No photophobia no phonophobia.   No seasonal allergies right now.   5 years ago, fell on ice and hit occiput of head and neck -- but everything came out normal. He was told h  No fevers, chills, weight loss. He did have once night sweat on Monday. No family history of aneurysm or brain issues, migraine, or autoimmune disease.  Sister did have hemiparesis in past due to high blood pressure -- at 38 -- unsure if stroke.     The following portions of the patient's history were reviewed and updated as appropriate: allergies, current medications, past family history, past medical history, past social history, past surgical history and problem list.    Review of Systems     Review of Systems   Constitutional:  Negative for chills and fever.   HENT:  Negative for ear pain, rhinorrhea and sore throat.    Eyes:  Negative for photophobia, pain, discharge, redness and visual disturbance.   Gastrointestinal:  Negative for nausea and vomiting.   Endocrine: Negative.    Genitourinary: " Negative.    Musculoskeletal:  Negative for arthralgias and back pain.   Skin:  Negative for rash.   Neurological:  Positive for headaches. Negative for dizziness, tremors, seizures, syncope, facial asymmetry, speech difficulty, weakness, light-headedness and numbness.   All other systems reviewed and are negative.      Active Problem List     Patient Active Problem List   Diagnosis   • Concussion with no loss of consciousness   • Convergence insufficiency   • Intractable acute post-traumatic headache   • Headaches due to old head injury   • History of 2019 novel coronavirus disease (COVID-19)   • Hypertension   • Stress fracture of right ankle   • Hyperlipidemia   • VENKAT (obstructive sleep apnea)   • Allergic rhinitis   • Closed fracture of left distal radius   • Venous insufficiency   • Stroke-like symptoms   • Excessive daytime sleepiness   • Neuropathy   • Neurogenic claudication       Objective     /88 (BP Location: Right arm, Patient Position: Sitting, Cuff Size: Large)   Pulse 76   Temp 98.6 °F (37 °C) (Temporal)   Wt 113 kg (249 lb 3.2 oz)   SpO2 98%   BMI 35.76 kg/m²     Physical Exam  Vitals reviewed.   Constitutional:       General: He is not in acute distress.     Appearance: Normal appearance. He is normal weight. He is not ill-appearing.   HENT:      Head: Normocephalic and atraumatic.      Nose: Nose normal. No congestion.      Mouth/Throat:      Mouth: Mucous membranes are moist.      Pharynx: Oropharynx is clear. No oropharyngeal exudate.   Eyes:      General:         Right eye: No discharge.         Left eye: No discharge.      Extraocular Movements: Extraocular movements intact.      Conjunctiva/sclera: Conjunctivae normal.      Pupils: Pupils are equal, round, and reactive to light.   Cardiovascular:      Rate and Rhythm: Normal rate and regular rhythm.      Pulses: Normal pulses.   Pulmonary:      Effort: Pulmonary effort is normal.   Musculoskeletal:         General: No swelling.  Normal range of motion.      Cervical back: Normal range of motion and neck supple.   Skin:     General: Skin is warm and dry.      Capillary Refill: Capillary refill takes less than 2 seconds.   Neurological:      General: No focal deficit present.      Mental Status: He is alert and oriented to person, place, and time.      Sensory: No sensory deficit.      Motor: No weakness.      Gait: Gait normal.      Comments: Strength, sensation intact  Spurling neg bilaterally   Psychiatric:         Mood and Affect: Mood normal.           Current Medications     Current Outpatient Medications:   •  methocarbamol (ROBAXIN) 500 mg tablet, Take 1 tablet (500 mg total) by mouth 2 (two) times a day as needed for muscle spasms Por la noche, Disp: 28 tablet, Rfl: 0  •  methylPREDNISolone 4 MG tablet therapy pack, Use as directed on package, Disp: 21 each, Rfl: 0  •  SUMAtriptan (IMITREX) 20 MG/ACT nasal spray, 1 spray (20 mg total) into each nostril once as needed for migraine for up to 6 doses ; if headache returns/persists, may repeat dose once after 2 hours; MAX 40 mg/24 hours, Disp: 6 each, Rfl: 0  •  verapamil (CALAN) 80 mg tablet, Take 1 tablet (80 mg total) by mouth 3 (three) times a day, Disp: 180 tablet, Rfl: 0  •  acetaminophen (TYLENOL) 500 mg tablet, Take 1 tablet (500 mg total) by mouth every 6 (six) hours as needed for mild pain, Disp: 30 tablet, Rfl: 0  •  cholecalciferol 1,000 units tablet, Take 1 tablet (1,000 Units total) by mouth daily, Disp: 90 tablet, Rfl: 1  •  Diclofenac Sodium (VOLTAREN) 1 %, Apply 2 g topically 4 (four) times a day (Patient not taking: Reported on 4/24/2024), Disp: 100 g, Rfl: 3  •  ergocalciferol (VITAMIN D2) 50,000 units, Take 1 capsule (50,000 Units total) by mouth once a week for 8 doses, Disp: 4 capsule, Rfl: 1  •  gabapentin (NEURONTIN) 100 mg capsule, Take 1 capsule (100 mg total) by mouth 2 (two) times a day, Disp: 60 capsule, Rfl: 1  •  ibuprofen (MOTRIN) 400 mg tablet, Take 1  tablet (400 mg total) by mouth every 6 (six) hours as needed for mild pain or headaches, Disp: 90 tablet, Rfl: 1  •  ibuprofen (MOTRIN) 800 mg tablet, Take 1 tablet (800 mg total) by mouth 3 (three) times a day, Disp: 21 tablet, Rfl: 0  •  pravastatin (PRAVACHOL) 20 mg tablet, Take 1 tablet (20 mg total) by mouth daily, Disp: 90 tablet, Rfl: 3  •  vitamin B-12 (VITAMIN B-12) 1,000 mcg tablet, Take 1 tablet (1,000 mcg total) by mouth daily, Disp: 30 tablet, Rfl: 2    Health Maintenance     Health Maintenance   Topic Date Due   • DTaP,Tdap,and Td Vaccines (1 - Tdap) Never done   • COVID-19 Vaccine (1 - 2023-24 season) Never done   • Influenza Vaccine (Season Ended) 09/01/2024   • BMI: Followup Plan  01/24/2025   • Annual Physical  01/24/2025   • Depression Screening  04/24/2025   • BMI: Adult  06/05/2025   • Zoster Vaccine (1 of 2) 06/09/2029   • RSV Vaccine Age 60+ Years (1 - 1-dose 60+ series) 06/09/2039   • HIV Screening  Completed   • Hepatitis C Screening  Completed   • RSV Vaccine age 0-20 Months  Aged Out   • Pneumococcal Vaccine: Pediatrics (0 to 5 Years) and At-Risk Patients (6 to 64 Years)  Aged Out   • HIB Vaccine  Aged Out   • IPV Vaccine  Aged Out   • Hepatitis A Vaccine  Aged Out   • Meningococcal ACWY Vaccine  Aged Out   • HPV Vaccine  Aged Out       There is no immunization history on file for this patient.        Emilie Soyeon Kim, MD  Internal Medicine  PGY-3  6/5/2024 5:26 PM

## 2024-06-12 ENCOUNTER — CONSULT (OUTPATIENT)
Dept: NEUROSURGERY | Facility: CLINIC | Age: 44
End: 2024-06-12
Payer: COMMERCIAL

## 2024-06-12 VITALS
BODY MASS INDEX: 35.65 KG/M2 | HEIGHT: 70 IN | DIASTOLIC BLOOD PRESSURE: 86 MMHG | SYSTOLIC BLOOD PRESSURE: 136 MMHG | HEART RATE: 80 BPM | OXYGEN SATURATION: 98 % | WEIGHT: 249 LBS | TEMPERATURE: 98.2 F

## 2024-06-12 DIAGNOSIS — R29.818 NEUROGENIC CLAUDICATION: ICD-10-CM

## 2024-06-12 DIAGNOSIS — M43.17 SPONDYLOLISTHESIS AT L5-S1 LEVEL: ICD-10-CM

## 2024-06-12 PROCEDURE — 99204 OFFICE O/P NEW MOD 45 MIN: CPT | Performed by: NEUROLOGICAL SURGERY

## 2024-06-12 NOTE — PROGRESS NOTES
Review of Systems   Constitutional: Negative.    HENT: Negative.     Eyes: Negative.    Respiratory: Negative.     Cardiovascular: Negative.    Gastrointestinal: Negative.    Endocrine: Negative.    Genitourinary: Negative.    Musculoskeletal:  Positive for back pain and myalgias.        Spondylolisthesis at L5-S1 level/   Neurogenic claudication   L/S MRI on 5/3/24- BLE EMG on 4/2/24      Intermittent Lbp radiates to b/l buttock and posterior legs R>L x 2 months.   + N/T/W on BLE   Muscle spasm/ leg cramping- occasional  Denies any B/B issues  Pain 5/10- constant, sharp, shooting   Meds: Naproxen- no relief    PT/PM/CHARLY-- none     Skin: Negative.    Allergic/Immunologic: Negative.    Neurological:  Positive for weakness and numbness.   Hematological: Negative.    Psychiatric/Behavioral: Negative.     All other systems reviewed and are negative.         Current Outpatient Medications:     acetaminophen (TYLENOL) 500 mg tablet, Take 1 tablet (500 mg total) by mouth every 6 (six) hours as needed for mild pain, Disp: 30 tablet, Rfl: 0    cholecalciferol 1,000 units tablet, Take 1 tablet (1,000 Units total) by mouth daily, Disp: 90 tablet, Rfl: 1    Diclofenac Sodium (VOLTAREN) 1 %, Apply 2 g topically 4 (four) times a day (Patient not taking: Reported on 4/24/2024), Disp: 100 g, Rfl: 3    ergocalciferol (VITAMIN D2) 50,000 units, Take 1 capsule (50,000 Units total) by mouth once a week for 8 doses, Disp: 4 capsule, Rfl: 1    gabapentin (NEURONTIN) 100 mg capsule, Take 1 capsule (100 mg total) by mouth 2 (two) times a day, Disp: 60 capsule, Rfl: 1    ibuprofen (MOTRIN) 400 mg tablet, Take 1 tablet (400 mg total) by mouth every 6 (six) hours as needed for mild pain or headaches, Disp: 90 tablet, Rfl: 1    ibuprofen (MOTRIN) 800 mg tablet, Take 1 tablet (800 mg total) by mouth 3 (three) times a day, Disp: 21 tablet, Rfl: 0    methocarbamol (ROBAXIN) 500 mg tablet, Take 1 tablet (500 mg total) by mouth 2 (two) times a day  as needed for muscle spasms Por la noche, Disp: 28 tablet, Rfl: 0    methylPREDNISolone 4 MG tablet therapy pack, Use as directed on package, Disp: 21 each, Rfl: 0    pravastatin (PRAVACHOL) 20 mg tablet, Take 1 tablet (20 mg total) by mouth daily, Disp: 90 tablet, Rfl: 3    SUMAtriptan (IMITREX) 20 MG/ACT nasal spray, 1 spray (20 mg total) into each nostril once as needed for migraine for up to 6 doses ; if headache returns/persists, may repeat dose once after 2 hours; MAX 40 mg/24 hours, Disp: 6 each, Rfl: 0    verapamil (CALAN) 80 mg tablet, Take 1 tablet (80 mg total) by mouth 3 (three) times a day, Disp: 180 tablet, Rfl: 0    vitamin B-12 (VITAMIN B-12) 1,000 mcg tablet, Take 1 tablet (1,000 mcg total) by mouth daily, Disp: 30 tablet, Rfl: 2

## 2024-06-12 NOTE — PROGRESS NOTES
"Neurosurgery   Efra Machado 44 y.o. male MRN: [id number]      Assessment & Plan   2 months of right leg pain > back pain.  Gr I spondylolisthesis at L5/S1.  I am less impressed with the retrolisthesis at L4/5  mentioned in the report.  EMG without radiculopathy.  No conservative measures to date.  PT and pain management referrals made.  He should return to discuss surgical options if no durable benefit from conservative measures.  I will consider lumbar flex/ext Xrays if surgery is being contemplated when I see him next.    I think that his complaints of leg cramping at night are polyfactorial and other etiologies should be consider by his primary and pain management.  I explained that his headaches are not related to any pathology in his lumbar spine        All questions answered    Chief Complaint:  right leg pain radiating into dorsal surface of foot    HPI    Legs bother him when he wakes up in the morning  A little back pain on the right side in the morning.  Right leg is worse than the left leg.  Right leg is worse than the back  During the day, activities at work lead to pain like walking, lifting, bending  2 months without inciting event  Medicines: \"I don't know the name\"  NO PT, No pain management  No incontinence  No change with valsalva      JOLANTA STOVER personally reviewed and updated.  Review of Systems    Vitals:    /86   Pulse 80   Temp 98.2 °F (36.8 °C) (Temporal)   Ht 5' 10\" (1.778 m)   Wt 113 kg (249 lb)   SpO2 98%   BMI 35.73 kg/m²     Past Medical History:   Diagnosis Date   • Concussion    • No known problems        History reviewed. No pertinent surgical history.      Current Outpatient Medications:   •  ibuprofen (MOTRIN) 400 mg tablet, Take 1 tablet (400 mg total) by mouth every 6 (six) hours as needed for mild pain or headaches, Disp: 90 tablet, Rfl: 1  •  pravastatin (PRAVACHOL) 20 mg tablet, Take 1 tablet (20 mg total) by mouth daily, Disp: 90 tablet, Rfl: 3  •  " SUMAtriptan (IMITREX) 20 MG/ACT nasal spray, 1 spray (20 mg total) into each nostril once as needed for migraine for up to 6 doses ; if headache returns/persists, may repeat dose once after 2 hours; MAX 40 mg/24 hours, Disp: 6 each, Rfl: 0  •  acetaminophen (TYLENOL) 500 mg tablet, Take 1 tablet (500 mg total) by mouth every 6 (six) hours as needed for mild pain, Disp: 30 tablet, Rfl: 0  •  cholecalciferol 1,000 units tablet, Take 1 tablet (1,000 Units total) by mouth daily (Patient not taking: Reported on 6/12/2024), Disp: 90 tablet, Rfl: 1  •  Diclofenac Sodium (VOLTAREN) 1 %, Apply 2 g topically 4 (four) times a day (Patient not taking: Reported on 4/24/2024), Disp: 100 g, Rfl: 3  •  ergocalciferol (VITAMIN D2) 50,000 units, Take 1 capsule (50,000 Units total) by mouth once a week for 8 doses, Disp: 4 capsule, Rfl: 1  •  gabapentin (NEURONTIN) 100 mg capsule, Take 1 capsule (100 mg total) by mouth 2 (two) times a day (Patient not taking: Reported on 6/12/2024), Disp: 60 capsule, Rfl: 1  •  ibuprofen (MOTRIN) 800 mg tablet, Take 1 tablet (800 mg total) by mouth 3 (three) times a day (Patient not taking: Reported on 6/12/2024), Disp: 21 tablet, Rfl: 0  •  methocarbamol (ROBAXIN) 500 mg tablet, Take 1 tablet (500 mg total) by mouth 2 (two) times a day as needed for muscle spasms Por la noche (Patient not taking: Reported on 6/12/2024), Disp: 28 tablet, Rfl: 0  •  methylPREDNISolone 4 MG tablet therapy pack, Use as directed on package, Disp: 21 each, Rfl: 0  •  verapamil (CALAN) 80 mg tablet, Take 1 tablet (80 mg total) by mouth 3 (three) times a day (Patient not taking: Reported on 6/12/2024), Disp: 180 tablet, Rfl: 0  •  vitamin B-12 (VITAMIN B-12) 1,000 mcg tablet, Take 1 tablet (1,000 mcg total) by mouth daily (Patient not taking: Reported on 6/12/2024), Disp: 30 tablet, Rfl: 2      No Known Allergies    Social History     Socioeconomic History   • Marital status: Single     Spouse name: Not on file   • Number of  children: Not on file   • Years of education: Not on file   • Highest education level: Not on file   Occupational History   • Not on file   Tobacco Use   • Smoking status: Former     Current packs/day: 0.00     Average packs/day: 1 pack/day for 15.0 years (15.0 ttl pk-yrs)     Types: Cigarettes     Start date:      Quit date: 2017     Years since quittin.4   • Smokeless tobacco: Never   Vaping Use   • Vaping status: Never Used   Substance and Sexual Activity   • Alcohol use: Yes     Comment: weekends only and half a pack   • Drug use: No   • Sexual activity: Yes     Partners: Female   Other Topics Concern   • Not on file   Social History Narrative   • Not on file     Social Determinants of Health     Financial Resource Strain: Low Risk  (2024)    Overall Financial Resource Strain (CARDIA)    • Difficulty of Paying Living Expenses: Not hard at all   Food Insecurity: Unknown (2024)    Hunger Vital Sign    • Worried About Running Out of Food in the Last Year: Never true    • Ran Out of Food in the Last Year: Patient declined   Transportation Needs: No Transportation Needs (2024)    PRAPARE - Transportation    • Lack of Transportation (Medical): No    • Lack of Transportation (Non-Medical): No   Physical Activity: Not on file   Stress: Not on file   Social Connections: Not on file   Intimate Partner Violence: Not on file   Housing Stability: Unknown (2024)    Housing Stability Vital Sign    • Unable to Pay for Housing in the Last Year: Patient declined    • Number of Places Lived in the Last Year: Not on file    • Unstable Housing in the Last Year: Not on file            Imaging:  MRI Images and Report of the Lumbar show Gr I spondy L5/S1.  I am less impressed with L4/5 retrolisthesis mentioned in report  EMG: no radiculopathy      Physical Exam    Well-developed well-nourished  Appears stated age of 44 y.o.  Awake alert oriented x3  Verbally interactive, mostly 1 word answers  fund of  knowledge: not assessed  Cranial nerves II through VIII intact   Motor strength 5 out of 5   No pronator drift  Sensory intact to light touch  No sensory changes on the dorsum of the spine.  Reflexes 1+and symmetric  Toes: downgoing  Gait: WNL  Unable to stand on heels or toes due to c/o back pain  Tandem walk: not assessed  Straight leg raising: with increased right leg pain  without right sided hip pain with hip rotation  without left sided hip pain with hip rotation  Memory: not assessed  Mood: saddened affect: blunted (may be recovering from a concussion still)  Language: Fluent in [place].  Good peripheral pulses in bilateral lower  extremities    Blue phone  for entire visit

## 2024-06-24 ENCOUNTER — OFFICE VISIT (OUTPATIENT)
Dept: NEUROLOGY | Facility: CLINIC | Age: 44
End: 2024-06-24
Payer: COMMERCIAL

## 2024-06-24 VITALS
DIASTOLIC BLOOD PRESSURE: 92 MMHG | HEIGHT: 70 IN | OXYGEN SATURATION: 96 % | WEIGHT: 247.5 LBS | BODY MASS INDEX: 35.43 KG/M2 | TEMPERATURE: 97.9 F | HEART RATE: 74 BPM | SYSTOLIC BLOOD PRESSURE: 144 MMHG

## 2024-06-24 DIAGNOSIS — M62.838 NECK MUSCLE SPASM: ICD-10-CM

## 2024-06-24 DIAGNOSIS — M54.9 BACK PAIN: ICD-10-CM

## 2024-06-24 DIAGNOSIS — M54.10 RADICULITIS: ICD-10-CM

## 2024-06-24 DIAGNOSIS — R29.818 NEUROGENIC CLAUDICATION: Primary | ICD-10-CM

## 2024-06-24 PROCEDURE — 99214 OFFICE O/P EST MOD 30 MIN: CPT | Performed by: PSYCHIATRY & NEUROLOGY

## 2024-06-24 RX ORDER — LANOLIN ALCOHOL/MO/W.PET/CERES
400 CREAM (GRAM) TOPICAL DAILY
Qty: 90 TABLET | Refills: 0 | Status: SHIPPED | OUTPATIENT
Start: 2024-06-24

## 2024-06-24 RX ORDER — METHOCARBAMOL 500 MG/1
500 TABLET, FILM COATED ORAL 2 TIMES DAILY PRN
Qty: 28 TABLET | Refills: 0 | Status: SHIPPED | OUTPATIENT
Start: 2024-06-24

## 2024-06-24 NOTE — PATIENT INSTRUCTIONS
A low-carbohydrate diet typically involves reducing the intake of foods rich in carbohydrates, such as bread, pasta, rice, and sugary snacks. Instead, it emphasizes foods that are high in protein, healthy fats, and non-starchy vegetables. Here's a basic outline of what a low-carb diet might include:    Protein: Include sources like poultry, fish, eggs, tofu, and lean cuts of meat. These provide essential amino acids for muscle repair and overall body function. Prefer white meats over red meats, as red meatscan promote inflammation   Healthy Fats: Incorporate sources like avocados, nuts, seeds, olive oil, and fatty fish (like salmon and mackerel). These fats are crucial for brain health and hormone regulation.  Non-Starchy Vegetables: Load up on leafy greens, broccoli, cauliflower, zucchini, bell peppers, and other low-carb vegetables. They're high in fiber, vitamins, and minerals but low in carbs.  Limited Fruits: While fruits are generally healthy, some are high in carbs. Opt for berries (like strawberries, blueberries, and raspberries) in moderation, as they are lower in sugar compared to other fruits.  Dairy: Choose full-fat dairy products like cheese, yogurt, and cream. However, some individuals may need to limit dairy if they have lactose intolerance or dairy sensitivities.  Nuts and Seeds: These are excellent sources of healthy fats, protein, and fiber. Just be mindful of portion sizes, as they can be calorie-dense.  Whole Grains (Optional): If you include grains, opt for whole grains like quinoa, barley, and oats in small portions. However, some people may prefer to eliminate grains entirely for a stricter low-carb approach.  Avoid Sugary Foods and Starchy Carbs: This includes sugary beverages, candies, pastries, white bread, pasta, rice, and potatoes.

## 2024-06-24 NOTE — PROGRESS NOTES
Ambulatory Visit  Name: Efra Machado      : 1980      MRN: [id number]  Encounter Provider: Nichelle Dietz MD  Encounter Date: 2024   Encounter department: [facility] [place]    Assessment & Plan   1. Neurogenic claudication  Assessment & Plan:  Patient with history of concussion with post traumatic headache, hypertension, R ankle fracture, Hyperlipidemia, VENKAT (Not on CPAP), fracture of the left radius, venous insufficiency and excessive daytime sleepiness who presents for neuropathy follow-up.  I last saw him on 2024.    Patient reports reports that symptom onset was around . It started with his medial right leg and was told it was a bony abnormality.  He had an AFO placed which helped improve symptoms.  After a year he started to experience paresthesias of both feet.  He reports having a heavy pain on the medial side of his distal right lower extremity and on his lateral side of his left lower extremity.  This symptom ascends up to below the knee.  Symptoms are worse with standing and walking long distances.  In the mornings he has significant difficulty putting his feet on the ground because that is when he feels the pain the most.  At bedtime he mostly feels a stinging like sensation in his extremities.  In the past patient used to smoke for 15 years 1 pack/day but quit.    On initial physical exam patient with decreased strength at bilateral hamstrings and right plantar extension.  He does have decreased range of movement at the ankles.  He has multiple sensory abnormalities with decreased vibration sense at the toes bilaterally and mid shin changing temperature sensation and diminished pinprick sensation of the right foot below the ankle.  Patient with bilateral suprapatellar reflexes present and a right cross adductor present.  Patient has an antalgic gait.  Available workup thus far including an EMG of bilateral lower extremities and a BALs were  normal.    Patient's symptoms thought to be secondary to neurogenic claudication from lumbar spinal stenosis given his history, weight and exam.  MRI L-spine was ordered which showed:    MRI L spine : Bilateral L5 spondylolysis with grade 1 L5 on S1 spondylolisthesis with moderate to severe bilateral neural foraminal narrowing causing encroachment of the exiting L5 nerve roots. Correlate with radiculopathy symptoms. Grade 1 degenerative retrolisthesis at the L4-5 level with secondary disc bulge and superimposed central disc protrusion. Mild bilateral neural foraminal narrowing at the L3-4 and L4-5 levels.  I placed a referral to neurosurgery.  They recommended conservative measures first.   If after conservative measures are done and surgery is still a consideration they will order a lumbar flexion/extension x-ray.    Today patient states that his symptoms are improving although still somewhat prominent in the morning and improves throughout the day.  Unclear what he did to improve however compared to our prior visit patient has had a 10 pound weight loss.  He states that 2 Saturdays ago he had a severe episode that lasted all day.    Given that his symptoms improved throughout today I will evaluate for RA.  I do believe that our main focus should be on weight loss.  I also agree with neurosurgery for physical therapy and pain management.    Plan:  Blood work: RF, CCP, B12  Methocarbamol for muscle spasms  Magnesium oxide 400 mg daily to decrease muscle tension  Agree with neurosurgery with conservative management first with physical therapy  Follow-up with pain management  Patient encouraged to continue doing a good job with weight loss.  Patient provided in his AVS a list of foods low in carbohydrates, high in protein, healthy fats and nonstarchy vegetables  Follow-up in 3 months    2. Neck muscle spasm  -     methocarbamol (ROBAXIN) 500 mg tablet; Take 1 tablet (500 mg total) by mouth 2 (two) times a day as  needed for muscle spasms Por la noche  -     magnesium Oxide (MAG-OX) 400 mg TABS; Take 1 tablet (400 mg total) by mouth daily  -     Vitamin B12; Future  3. Radiculitis  -     magnesium Oxide (MAG-OX) 400 mg TABS; Take 1 tablet (400 mg total) by mouth daily  -     Vitamin B12; Future  4. Back pain  -     RF Screen w/ Reflex to Titer; Future  -     Cyclic citrul peptide antibody, IgG; Future    History of Present Illness       Patient with history of concussion with post traumatic headache, hypertension, R ankle fracture, Hyperlipidemia, VENKAT (Not on CPAP), fracture of the left radius, venous insufficiency and excessive daytime sleepiness who presents for neuropathy follow-up.  I last saw him on 4/24/2024.    Initial visit:  Patient reports reports that symptom onset was 2 years ago.  It started with his medial right leg and was told it was a bony abnormality.  He had an AFO placed which helped improve symptoms.  After a year he started to experience paresthesias of both feet.  He reports having a heavy pain on the medial side of his distal right lower extremity and on his lateral side of his left lower extremity.  This symptom ascends up to below the knee.  Symptoms are worse with standing and walking long distances.  In the mornings he has significant difficulty putting his feet on the ground because that is when he feels the pain the most.  At bedtime he mostly feels a stinging like sensation in his extremities.  In the past patient used to smoke for 15 years 1 pack/day but quit.     On physical exam today patient with decreased strength at bilateral hamstrings and right plantar extension.  He does have decreased range of movement at the ankles.  He has multiple sensory abnormalities with decreased vibration sense at the toes bilaterally and mid shin changing temperature sensation and diminished pinprick sensation of the right foot below the ankle.  Patient with bilateral suprapatellar reflexes present and a right  cross adductor present.  Patient has an antalgic gait.  Available workup thus far including an EMG of bilateral lower extremities and a BALs were normal.     At this time I believe that patient likely has neurogenic claudication from lumbar spinal stenosis given his history, weight and exam.  I will do an MRI of the L-spine to evaluate for th.is.  Pending results will refer to neurosurgery.  Referral also placed for weight management to help with weight loss.  I advised patient to pursue obtaining a CPAP machine as this could contribute to weight gain if left untreated.  Follow-up after MRI results    Workup:    MRI L spine : Bilateral L5 spondylolysis with grade 1 L5 on S1 spondylolisthesis with moderate to severe bilateral neural foraminal narrowing causing encroachment of the exiting L5 nerve roots. Correlate with radiculopathy symptoms.     Grade 1 degenerative retrolisthesis at the L4-5 level with secondary disc bulge and superimposed central disc protrusion. Mild bilateral neural foraminal narrowing at the L3-4 and L4-5 levels.    Neurosurgery consult (6/12/2024):less impressed with the retrolisthesis at L4/5  mentioned in the report.  EMG without radiculopathy.  No conservative measures to date.  PT and pain management referrals made.  He should return to discuss surgical options if no durable benefit from conservative measures.  I will consider lumbar flex/ext Xrays if surgery is being contemplated when I see him next. I think that his complaints of leg cramping at night are polyfactorial and other etiologies should be consider by his primary and pain management.    Interval history:  Sx improving   Unclear what he did to improve   Continues to have sx that are more prominent in the morning   Went to Neurosurgery- recommended conservative management   Two Saturdays ago had a severe episode that last all day.   Improves throughout at the day       Review of Systems   Constitutional:  Negative for appetite  change, fatigue and fever.   HENT: Negative.  Negative for hearing loss, tinnitus, trouble swallowing and voice change.    Eyes: Negative.  Negative for photophobia, pain and visual disturbance.   Respiratory: Negative.  Negative for shortness of breath.    Cardiovascular: Negative.  Negative for palpitations.   Gastrointestinal: Negative.  Negative for nausea and vomiting.   Endocrine: Negative.  Negative for cold intolerance.   Genitourinary: Negative.  Negative for dysuria, frequency and urgency.   Musculoskeletal:  Negative for back pain, gait problem, myalgias, neck pain and neck stiffness.   Skin: Negative.  Negative for rash.   Allergic/Immunologic: Negative.    Neurological:  Positive for headaches (Almost daily). Negative for dizziness, tremors, seizures, syncope, facial asymmetry, speech difficulty, weakness, light-headedness and numbness.   Hematological: Negative.  Does not bruise/bleed easily.   Psychiatric/Behavioral: Negative.  Negative for confusion, hallucinations and sleep disturbance.    All other systems reviewed and are negative.    Medical History Reviewed by provider this encounter:  Problems       Current Outpatient Medications on File Prior to Visit   Medication Sig Dispense Refill   • acetaminophen (TYLENOL) 500 mg tablet Take 1 tablet (500 mg total) by mouth every 6 (six) hours as needed for mild pain (Patient not taking: Reported on 6/24/2024) 30 tablet 0   • cholecalciferol 1,000 units tablet Take 1 tablet (1,000 Units total) by mouth daily (Patient not taking: Reported on 6/12/2024) 90 tablet 1   • Diclofenac Sodium (VOLTAREN) 1 % Apply 2 g topically 4 (four) times a day (Patient not taking: Reported on 4/24/2024) 100 g 3   • ergocalciferol (VITAMIN D2) 50,000 units Take 1 capsule (50,000 Units total) by mouth once a week for 8 doses (Patient not taking: Reported on 6/24/2024) 4 capsule 1   • ibuprofen (MOTRIN) 400 mg tablet Take 1 tablet (400 mg total) by mouth every 6 (six) hours as  "needed for mild pain or headaches (Patient not taking: Reported on 2024) 90 tablet 1   • ibuprofen (MOTRIN) 800 mg tablet Take 1 tablet (800 mg total) by mouth 3 (three) times a day (Patient not taking: Reported on 2024) 21 tablet 0   • methylPREDNISolone 4 MG tablet therapy pack Use as directed on package (Patient not taking: Reported on 2024) 21 each 0   • pravastatin (PRAVACHOL) 20 mg tablet Take 1 tablet (20 mg total) by mouth daily (Patient not taking: Reported on 2024) 90 tablet 3   • SUMAtriptan (IMITREX) 20 MG/ACT nasal spray 1 spray (20 mg total) into each nostril once as needed for migraine for up to 6 doses ; if headache returns/persists, may repeat dose once after 2 hours; MAX 40 mg/24 hours (Patient not taking: Reported on 2024) 6 each 0   • verapamil (CALAN) 80 mg tablet Take 1 tablet (80 mg total) by mouth 3 (three) times a day (Patient not taking: Reported on 2024) 180 tablet 0   • vitamin B-12 (VITAMIN B-12) 1,000 mcg tablet Take 1 tablet (1,000 mcg total) by mouth daily (Patient not taking: Reported on 2024) 30 tablet 2     No current facility-administered medications on file prior to visit.      Social History     Tobacco Use   • Smoking status: Former     Current packs/day: 0.00     Average packs/day: 1 pack/day for 15.0 years (15.0 ttl pk-yrs)     Types: Cigarettes     Start date:      Quit date: 2017     Years since quittin.5   • Smokeless tobacco: Never   Vaping Use   • Vaping status: Never Used   Substance and Sexual Activity   • Alcohol use: Not Currently     Comment: weekends only and half a pack   • Drug use: No   • Sexual activity: Yes     Partners: Female     Objective     /92 (BP Location: Left arm, Patient Position: Sitting, Cuff Size: Adult)   Pulse 74   Temp 97.9 °F (36.6 °C) (Temporal)   Ht 5' 10\" (1.778 m)   Wt 112 kg (247 lb 8 oz)   SpO2 96%   BMI 35.51 kg/m²     Physical Exam  Administrative Statements   I have spent a total " time of 25 minutes in caring for this patient on the day of the visit/encounter including Diagnostic results, Prognosis, Risks and benefits of tx options, Instructions for management, Patient and family education, Importance of tx compliance, Risk factor reductions, Impressions, Counseling / Coordination of care, Documenting in the medical record, Reviewing / ordering tests, medicine, procedures  , Obtaining or reviewing history  , and Communicating with other healthcare professionals .

## 2024-07-03 ENCOUNTER — TELEPHONE (OUTPATIENT)
Dept: INTERNAL MEDICINE CLINIC | Facility: CLINIC | Age: 44
End: 2024-07-03

## 2024-07-07 NOTE — ASSESSMENT & PLAN NOTE
Patient with history of concussion with post traumatic headache, hypertension, R ankle fracture, Hyperlipidemia, VENKAT (Not on CPAP), fracture of the left radius, venous insufficiency and excessive daytime sleepiness who presents for neuropathy follow-up.  I last saw him on 4/24/2024.    Patient reports reports that symptom onset was around 2022. It started with his medial right leg and was told it was a bony abnormality.  He had an AFO placed which helped improve symptoms.  After a year he started to experience paresthesias of both feet.  He reports having a heavy pain on the medial side of his distal right lower extremity and on his lateral side of his left lower extremity.  This symptom ascends up to below the knee.  Symptoms are worse with standing and walking long distances.  In the mornings he has significant difficulty putting his feet on the ground because that is when he feels the pain the most.  At bedtime he mostly feels a stinging like sensation in his extremities.  In the past patient used to smoke for 15 years 1 pack/day but quit.    On initial physical exam patient with decreased strength at bilateral hamstrings and right plantar extension.  He does have decreased range of movement at the ankles.  He has multiple sensory abnormalities with decreased vibration sense at the toes bilaterally and mid shin changing temperature sensation and diminished pinprick sensation of the right foot below the ankle.  Patient with bilateral suprapatellar reflexes present and a right cross adductor present.  Patient has an antalgic gait.  Available workup thus far including an EMG of bilateral lower extremities and a BALs were normal.    Patient's symptoms thought to be secondary to neurogenic claudication from lumbar spinal stenosis given his history, weight and exam.  MRI L-spine was ordered which showed:    MRI L spine : Bilateral L5 spondylolysis with grade 1 L5 on S1 spondylolisthesis with moderate to severe  bilateral neural foraminal narrowing causing encroachment of the exiting L5 nerve roots. Correlate with radiculopathy symptoms. Grade 1 degenerative retrolisthesis at the L4-5 level with secondary disc bulge and superimposed central disc protrusion. Mild bilateral neural foraminal narrowing at the L3-4 and L4-5 levels.  I placed a referral to neurosurgery.  They recommended conservative measures first.   If after conservative measures are done and surgery is still a consideration they will order a lumbar flexion/extension x-ray.    Today patient states that his symptoms are improving although still somewhat prominent in the morning and improves throughout the day.  Unclear what he did to improve however compared to our prior visit patient has had a 10 pound weight loss.  He states that 2 Saturdays ago he had a severe episode that lasted all day.    Given that his symptoms improved throughout today I will evaluate for RA.  I do believe that our main focus should be on weight loss.  I also agree with neurosurgery for physical therapy and pain management.    Plan:  Blood work: RF, CCP, B12  Methocarbamol for muscle spasms  Magnesium oxide 400 mg daily to decrease muscle tension  Agree with neurosurgery with conservative management first with physical therapy  Follow-up with pain management  Patient encouraged to continue doing a good job with weight loss.  Patient provided in his AVS a list of foods low in carbohydrates, high in protein, healthy fats and nonstarchy vegetables  Follow-up in 3 months

## 2024-08-11 ENCOUNTER — HOSPITAL ENCOUNTER (EMERGENCY)
Facility: HOSPITAL | Age: 44
Discharge: HOME | End: 2024-08-11
Attending: EMERGENCY MEDICINE
Payer: COMMERCIAL

## 2024-08-11 VITALS
OXYGEN SATURATION: 95 % | TEMPERATURE: 98.5 F | SYSTOLIC BLOOD PRESSURE: 167 MMHG | HEART RATE: 83 BPM | DIASTOLIC BLOOD PRESSURE: 99 MMHG | RESPIRATION RATE: 18 BRPM

## 2024-08-11 DIAGNOSIS — M54.16 LUMBAR RADICULOPATHY, ACUTE: Primary | ICD-10-CM

## 2024-08-11 PROCEDURE — 96372 THER/PROPH/DIAG INJ SC/IM: CPT

## 2024-08-11 PROCEDURE — 99284 EMERGENCY DEPT VISIT MOD MDM: CPT | Performed by: EMERGENCY MEDICINE

## 2024-08-11 PROCEDURE — 99284 EMERGENCY DEPT VISIT MOD MDM: CPT

## 2024-08-11 RX ORDER — KETOROLAC TROMETHAMINE 30 MG/ML
30 INJECTION, SOLUTION INTRAMUSCULAR; INTRAVENOUS ONCE
Status: COMPLETED | OUTPATIENT
Start: 2024-08-11 | End: 2024-08-11

## 2024-08-11 RX ORDER — METHYLPREDNISOLONE 4 MG
TABLET, DOSE PACK ORAL
Qty: 21 TABLET | Refills: 0 | Status: SHIPPED | OUTPATIENT
Start: 2024-08-11

## 2024-08-11 RX ORDER — TRAMADOL HYDROCHLORIDE 50 MG/1
50 TABLET ORAL EVERY 6 HOURS PRN
Qty: 15 TABLET | Refills: 0 | Status: SHIPPED | OUTPATIENT
Start: 2024-08-11 | End: 2024-08-21

## 2024-08-11 RX ADMIN — KETOROLAC TROMETHAMINE 30 MG: 30 INJECTION, SOLUTION INTRAMUSCULAR; INTRAVENOUS at 09:12

## 2024-08-11 NOTE — Clinical Note
Efra Machado was seen and treated in our emergency department on 8/11/2024.    No restrictions            Diagnosis:     Efra  may return to work on return date.    He may return on this date: 08/14/2024         If you have any questions or concerns, please don't hesitate to call.      Alanna Rudolph, DO    ______________________________           _______________          _______________  Hospital Representative                              Date                                Time

## 2024-08-11 NOTE — ED PROVIDER NOTES
History  Chief Complaint   Patient presents with    Leg Pain     Began 2 days ago with LLE pain that radiates through the buttocks and down the posterior of the LLE. Pt denies injury.     44-year-old male with history of hypertension, migraine headaches, neurogenic claudication, obesity presents to the emergency department with a 2-day history of atraumatic left-sided low back pain radiating down the posterior aspect of his left leg to his left calf.  Patient does a lot of walking at work which he believes may have exacerbated the pain.  He sometimes feels numbness or tingling in the left lower leg but this has been an ongoing issue.  No weakness.  No fevers.  No bowel or bladder incontinence or retention.  Patient had an MRI of the lumbar spine on 5/3/2024 showing:Bilateral L5 spondylolysis with grade 1 L5 on S1 spondylolisthesis with moderate to severe bilateral neural foraminal narrowing causing encroachment of the exiting L5 nerve roots. Correlate with radiculopathy symptoms.     Grade 1 degenerative retrolisthesis at the L4-5 level with secondary disc bulge and superimposed central disc protrusion. Mild bilateral neural foraminal narrowing at the L3-4 and L4-5 levels.  Patient did see neurosurgery in June regarding this MRI report and at this time it was recommended he proceed with conservative management.  If conservative management does not work then surgery will be discussed.  There were referrals made for physical therapy and pain management, the patient has not yet made these appointments.      History provided by:  Patient and spouse   used: Yes    Back Pain  Location:  Sacro-iliac joint and gluteal region  Quality:  Shooting  Radiates to: Posterior aspect left leg to calf.  Pain is:  Same all the time  Onset quality:  Gradual  Duration:  2 days  Timing:  Constant  Progression:  Unchanged  Chronicity:  New  Context: not falling, not jumping from heights, not lifting heavy objects, not  MCA, not MVA, not occupational injury, not pedestrian accident, not physical stress, not recent illness, not recent injury and not twisting    Relieved by:  Nothing  Worsened by:  Twisting and standing  Ineffective treatments:  Ibuprofen  Associated symptoms: leg pain and paresthesias    Associated symptoms: no abdominal pain, no abdominal swelling, no bladder incontinence, no bowel incontinence, no fever, no headaches, no numbness, no perianal numbness, no tingling and no weakness    Risk factors: obesity    Risk factors: no hx of cancer, no recent surgery and no vascular disease        Prior to Admission Medications   Prescriptions Last Dose Informant Patient Reported? Taking?   Diclofenac Sodium (VOLTAREN) 1 %  Self No No   Sig: Apply 2 g topically 4 (four) times a day   Patient not taking: Reported on 2024   SUMAtriptan (IMITREX) 20 MG/ACT nasal spray  Self No No   Si spray (20 mg total) into each nostril once as needed for migraine for up to 6 doses ; if headache returns/persists, may repeat dose once after 2 hours; MAX 40 mg/24 hours   Patient not taking: Reported on 2024   acetaminophen (TYLENOL) 500 mg tablet  Self No No   Sig: Take 1 tablet (500 mg total) by mouth every 6 (six) hours as needed for mild pain   Patient not taking: Reported on 2024   cholecalciferol 1,000 units tablet  Self No No   Sig: Take 1 tablet (1,000 Units total) by mouth daily   Patient not taking: Reported on 2024   ergocalciferol (VITAMIN D2) 50,000 units  Self No No   Sig: Take 1 capsule (50,000 Units total) by mouth once a week for 8 doses   Patient not taking: Reported on 2024   ibuprofen (MOTRIN) 400 mg tablet  Self No No   Sig: Take 1 tablet (400 mg total) by mouth every 6 (six) hours as needed for mild pain or headaches   Patient not taking: Reported on 2024   ibuprofen (MOTRIN) 800 mg tablet  Self No No   Sig: Take 1 tablet (800 mg total) by mouth 3 (three) times a day   Patient not taking:  Reported on 2024   magnesium Oxide (MAG-OX) 400 mg TABS   No No   Sig: Take 1 tablet (400 mg total) by mouth daily   methocarbamol (ROBAXIN) 500 mg tablet   No No   Sig: Take 1 tablet (500 mg total) by mouth 2 (two) times a day as needed for muscle spasms Por la noche   methylPREDNISolone 4 MG tablet therapy pack  Self No No   Sig: Use as directed on package   Patient not taking: Reported on 2024   pravastatin (PRAVACHOL) 20 mg tablet  Self No No   Sig: Take 1 tablet (20 mg total) by mouth daily   Patient not taking: Reported on 2024   verapamil (CALAN) 80 mg tablet  Self No No   Sig: Take 1 tablet (80 mg total) by mouth 3 (three) times a day   Patient not taking: Reported on 2024   vitamin B-12 (VITAMIN B-12) 1,000 mcg tablet  Self No No   Sig: Take 1 tablet (1,000 mcg total) by mouth daily   Patient not taking: Reported on 2024      Facility-Administered Medications: None       Past Medical History:   Diagnosis Date    Cluster headache     Concussion     Hypertension     Neurogenic claudication     VENKAT (obstructive sleep apnea)     Vitamin D deficiency        History reviewed. No pertinent surgical history.    Family History   Problem Relation Age of Onset    Hypertension Mother     No Known Problems Father     No Known Problems Sister     No Known Problems Brother     No Known Problems Son     No Known Problems Daughter      I have reviewed and agree with the history as documented.    E-Cigarette/Vaping    E-Cigarette Use Never User      E-Cigarette/Vaping Substances    Nicotine No     THC No     CBD No     Flavoring No     Other No     Unknown No      Social History     Tobacco Use    Smoking status: Former     Current packs/day: 0.00     Average packs/day: 1 pack/day for 15.0 years (15.0 ttl pk-yrs)     Types: Cigarettes     Start date:      Quit date: 2017     Years since quittin.6    Smokeless tobacco: Never   Vaping Use    Vaping status: Never Used   Substance Use Topics     Alcohol use: Not Currently     Comment: weekends only and half a pack    Drug use: No       Review of Systems   Constitutional: Negative.  Negative for fever.   HENT: Negative.     Eyes: Negative.    Respiratory: Negative.     Cardiovascular: Negative.    Gastrointestinal: Negative.  Negative for abdominal pain and bowel incontinence.   Genitourinary: Negative.  Negative for bladder incontinence.   Musculoskeletal:  Positive for back pain. Negative for neck pain.   Skin: Negative.    Allergic/Immunologic: Negative.    Neurological:  Positive for paresthesias. Negative for tingling, weakness, numbness and headaches.   Hematological: Negative.    Psychiatric/Behavioral: Negative.     All other systems reviewed and are negative.      Physical Exam  Physical Exam  Vitals and nursing note reviewed.   Constitutional:       General: He is awake. He is not in acute distress.     Appearance: Normal appearance. He is well-developed and overweight. He is not ill-appearing, toxic-appearing or diaphoretic.   HENT:      Head: Normocephalic and atraumatic.      Right Ear: External ear normal.      Left Ear: External ear normal.      Nose: Nose normal.      Mouth/Throat:      Mouth: Oropharynx is clear and moist.   Eyes:      General: No scleral icterus.     Extraocular Movements: EOM normal.      Conjunctiva/sclera: Conjunctivae normal.   Neck:      Thyroid: No thyromegaly.      Vascular: No JVD.   Musculoskeletal:         General: Tenderness present. No swelling, deformity, signs of injury or edema.      Lumbar back: Tenderness and bony tenderness (Over SI joint) present. No swelling, edema, deformity, signs of trauma, lacerations or spasms. Normal range of motion. Positive left straight leg raise test. Negative right straight leg raise test. No scoliosis.        Back:       Right lower leg: No edema.      Left lower leg: No edema.   Skin:     General: Skin is warm and dry.      Coloration: Skin is not jaundiced or pale.       Findings: No bruising, erythema, lesion or rash.   Neurological:      General: No focal deficit present.      Mental Status: He is alert and oriented to person, place, and time.      Motor: No weakness.      Gait: Gait abnormal (Antalgic gait).      Deep Tendon Reflexes: Reflexes are normal and symmetric. Reflexes normal.   Psychiatric:         Mood and Affect: Mood and affect and mood normal.         Behavior: Behavior is cooperative.         Vital Signs  ED Triage Vitals [08/11/24 0843]   Temperature Pulse Respirations Blood Pressure SpO2   98.5 °F (36.9 °C) 83 18 167/99 95 %      Temp Source Heart Rate Source Patient Position - Orthostatic VS BP Location FiO2 (%)   Oral -- Sitting Right arm --      Pain Score       10 - Worst Possible Pain           Vitals:    08/11/24 0843   BP: 167/99   Pulse: 83   Patient Position - Orthostatic VS: Sitting         Visual Acuity      ED Medications  Medications   ketorolac (TORADOL) injection 30 mg (has no administration in time range)       Diagnostic Studies  Results Reviewed       None                   No orders to display              Procedures  Procedures         ED Course                                               Medical Decision Making  44-year-old male presents to the emergency department with a 2-day history of posterior left buttock pain radiating down the posterior aspect of his left leg to his left calf.  No known injury but he does do a lot of walking at work and believes this exacerbated the symptoms.  He did have an MRI in early May of the lumbar spine showing lumbar disc disease.  He saw neurosurgery in June and it was recommended that he have conservative therapy with physical therapy and pain management but he has not yet done these things.  He has had no weakness of the leg but does complain of some intermittent paresthesias to the left lower leg which according to the notes has been an ongoing problem.  He has had no bowel or bladder incontinence.  No  fevers.  No prior back surgeries.  No IV drug use.  On exam he is alert sitting on the side of the bed.  He states the pain is worse when he tries to stand.  He does have tenderness over the left SI joint and buttock region and seems to have worse pain when he extends his left leg at the knee.  He has normal strength and reflexes of the lower extremities.  No clinical concern for acute spinal cord compression.  Will treat with short course pain meds as well as Medrol Dosepak.  Will place ambulatory referral again for comprehensive spine as well as pain management.  Patient is requesting crutches to get around as the cane is too short for him.  Patient is stable for discharge    Risk  Prescription drug management.                 Disposition  Final diagnoses:   Lumbar radiculopathy, acute     Time reflects when diagnosis was documented in both MDM as applicable and the Disposition within this note       Time User Action Codes Description Comment    8/11/2024  9:07 AM Alanna Rudolph Add [M54.16] Lumbar radiculopathy, acute           ED Disposition       ED Disposition   Discharge    Condition   Good    Date/Time   Sun Aug 11, 2024  9:07 AM    Comment   Efra Machado discharge to home/self care.                   Follow-up Information       Follow up With Specialties Details Why Contact Info    Nikolas Arnold,  Internal Medicine Schedule an appointment as soon as possible for a visit in 2 days If symptoms worsen [address] [id number]  [phone]              Patient's Medications   Discharge Prescriptions    METHYLPREDNISOLONE 4 MG TABLET THERAPY PACK    Use as directed on package       Start Date: 8/11/2024 End Date: --       Order Dose: --       Quantity: 21 tablet    Refills: 0    TRAMADOL (ULTRAM) 50 MG TABLET    Take 1 tablet (50 mg total) by mouth every 6 (six) hours as needed for moderate pain or severe pain for up to 10 days       Start Date: 8/11/2024 End Date: 8/21/2024        Order Dose: 50 mg       Quantity: 15 tablet    Refills: 0           PDMP Review         Value Time User    PDMP Reviewed  Yes 8/11/2024  8:46 AM Alanna Rudolph DO            ED Provider  Electronically Signed by             Alanna Rudolph DO  08/11/24 0910

## 2024-08-12 ENCOUNTER — OFFICE VISIT (OUTPATIENT)
Dept: INTERNAL MEDICINE CLINIC | Facility: CLINIC | Age: 44
End: 2024-08-12

## 2024-08-12 ENCOUNTER — TELEPHONE (OUTPATIENT)
Dept: PHYSICAL THERAPY | Facility: OTHER | Age: 44
End: 2024-08-12

## 2024-08-12 VITALS
OXYGEN SATURATION: 98 % | DIASTOLIC BLOOD PRESSURE: 89 MMHG | BODY MASS INDEX: 35.47 KG/M2 | SYSTOLIC BLOOD PRESSURE: 142 MMHG | HEART RATE: 61 BPM | WEIGHT: 247.2 LBS | TEMPERATURE: 98 F

## 2024-08-12 DIAGNOSIS — R29.818 NEUROGENIC CLAUDICATION: Primary | ICD-10-CM

## 2024-08-12 RX ORDER — GABAPENTIN 300 MG/1
300 CAPSULE ORAL 3 TIMES DAILY
Qty: 90 CAPSULE | Refills: 1 | Status: SHIPPED | OUTPATIENT
Start: 2024-08-12 | End: 2025-02-08

## 2024-08-12 NOTE — TELEPHONE ENCOUNTER
KATHLEEN Goldstein #[id number]    Call placed to the patient per [facility] referral.    Voice message left for patient to call back. Phone number and hours of business provided.     This is the 1st attempt to reach the patient.  Will defer per protocol.

## 2024-08-12 NOTE — LETTER
August 12, 2024     Patient: Efra Machado  YOB: 1980  Date of Visit: 8/12/2024      To Whom it May Concern:    Efra Machado is under my professional care. Efra was seen in my office on 8/12/2024. Efra may return to work on 8/19/24 .    If you have any questions or concerns, please don't hesitate to call.         Sincerely,          Autumn Villalpando PA-C        CC: No Recipients

## 2024-08-13 ENCOUNTER — TELEPHONE (OUTPATIENT)
Age: 44
End: 2024-08-13

## 2024-08-13 PROBLEM — R29.90 STROKE-LIKE SYMPTOMS: Status: RESOLVED | Noted: 2024-01-29 | Resolved: 2024-08-13

## 2024-08-13 PROBLEM — S06.0X0A CONCUSSION WITH NO LOSS OF CONSCIOUSNESS: Status: RESOLVED | Noted: 2018-12-12 | Resolved: 2024-08-13

## 2024-08-13 PROBLEM — G62.9 NEUROPATHY: Status: RESOLVED | Noted: 2024-04-02 | Resolved: 2024-08-13

## 2024-08-13 PROBLEM — G47.19 EXCESSIVE DAYTIME SLEEPINESS: Status: RESOLVED | Noted: 2024-03-15 | Resolved: 2024-08-13

## 2024-08-13 PROBLEM — S52.502A CLOSED FRACTURE OF LEFT DISTAL RADIUS: Status: RESOLVED | Noted: 2023-07-14 | Resolved: 2024-08-13

## 2024-08-13 PROBLEM — M84.371A STRESS FRACTURE OF RIGHT ANKLE: Status: RESOLVED | Noted: 2022-09-28 | Resolved: 2024-08-13

## 2024-08-13 PROBLEM — Z86.16 HISTORY OF 2019 NOVEL CORONAVIRUS DISEASE (COVID-19): Status: RESOLVED | Noted: 2020-04-29 | Resolved: 2024-08-13

## 2024-08-13 NOTE — ASSESSMENT & PLAN NOTE
EMG 4/2/24: unremarkable/normal.  MRI lumbar spine 5/3/24: Bilateral L5 spondylolysis with grade 1 L5 on S1 spondylolisthesis with moderate to severe bilateral neural foraminal narrowing causing encroachment of the exiting L5 nerve roots. Grade 1 degenerative retrolisthesis at the L4-5 level with secondary disc bulge and superimposed central disc protrusion. Mild bilateral neural foraminal narrowing at the L3-4 and L4-5 levels.  Arterial duplex b/l LE 9/15/22: normal study.     This is likely acute on chronic. He did consult with neurosurgery on 6/12/24 who recommended conservative management that consists of pain management and PT. He has not tried PT or established with pain management. He has been seeing neurology who has tried magnesium and methocarbamol. Patient is unsure if he tried these and if they helped. Recommend consult with pain management and/or re-consulting neurosurgery. He would like to see neurosurgery as he states he can not walk or work like. In the mean time try supportive treatment. Start gabapentin 300 mg TID. Discussed titration. Finish Medrol pack given yesterday. May take Tramadol prn, but do not recommend long term. Tylenol as needed. Warm/cool compresses. Stretch throughout the day. ER precautions given.

## 2024-08-13 NOTE — TELEPHONE ENCOUNTER
Pts wife calling in stating pt is unable to walk and needs to go back to work. Pts wife  would like sooner appt then the one in September with Dr harding. No appts available.     Please assist, Thank you.

## 2024-08-13 NOTE — PROGRESS NOTES
Ambulatory Visit  Name: Efra Machado      : 1980      MRN: [id number]  Encounter Provider: Autumn Villalpando PA-C  Encounter Date: 2024   [facility]    Assessment & Plan   1. Neurogenic claudication  Assessment & Plan:  EMG 24: unremarkable/normal.  MRI lumbar spine 5/3/24: Bilateral L5 spondylolysis with grade 1 L5 on S1 spondylolisthesis with moderate to severe bilateral neural foraminal narrowing causing encroachment of the exiting L5 nerve roots. Grade 1 degenerative retrolisthesis at the L4-5 level with secondary disc bulge and superimposed central disc protrusion. Mild bilateral neural foraminal narrowing at the L3-4 and L4-5 levels.  Arterial duplex b/l LE 9/15/22: normal study.     This is likely acute on chronic. He did consult with neurosurgery on 24 who recommended conservative management that consists of pain management and PT. He has not tried PT or established with pain management. He has been seeing neurology who has tried magnesium and methocarbamol. Patient is unsure if he tried these and if they helped. Recommend consult with pain management and/or re-consulting neurosurgery. He would like to see neurosurgery as he states he can not walk or work like. In the mean time try supportive treatment. Start gabapentin 300 mg TID. Discussed titration. Finish Medrol pack given yesterday. May take Tramadol prn, but do not recommend long term. Tylenol as needed. Warm/cool compresses. Stretch throughout the day. ER precautions given.  Orders:  -     Ambulatory Referral to Neurosurgery; Future  -     gabapentin (NEURONTIN) 300 mg capsule; Take 1 capsule (300 mg total) by mouth 3 (three) times a day       History of Present Illness     Patient is a 44 year old male presenting for a same day appointment. He is complaining of acutely worsening low back pain that radiates into his left leg. The pain in his back is localized to  her left low back, radiates into buttock and down the entire leg. States it is a sharp constant pain. Sometimes has cramping in his legs as well. This pain he presents for today is chronic in nature, but worsened on Friday. States he was at work and he did something that he felt in his back. He can not describe it or say what he was doing. He went home and when he tried to get out of his car, he couldn't stand up. Denies weakness, but states he feels unstable and may fall over. He also states it is too painful to stand. He has been using crutches to walk around. This back pain has been present for over 2 years. States he has been seeing other doctors for this, but feels he is not getting anywhere. He does feel his left leg is numb and at times tingly. Denies saddle anesthesia. Denies fecal or urinary incontinence. He did go to the ER for this yesterday. He was given Tramadol and Medrol pack. He feels it has not helped yet. He has taken other medications in the past, but unsure what. States nothing helps. He has not tried physical therapy. He does not stretch. States nothing makes it better. Currently 9/10 pain. Sometimes worse. He does reports he has still been able to sleep a full 8 hours. When he gets up in the morning is when his pain is typically the worst. He is here with his wife who also provided much of the history.         Review of Systems   Constitutional:  Negative for appetite change, chills, diaphoresis, fatigue, fever and unexpected weight change.   Respiratory:  Negative for cough, chest tightness, shortness of breath and wheezing.    Cardiovascular:  Negative for chest pain, palpitations and leg swelling.   Gastrointestinal:  Negative for abdominal pain, diarrhea, nausea and vomiting.   Genitourinary:  Negative for difficulty urinating, frequency and urgency.   Musculoskeletal:  Positive for arthralgias, back pain, gait problem and myalgias. Negative for joint swelling, neck pain and neck stiffness.    Skin:  Negative for rash.   Neurological:  Positive for numbness. Negative for dizziness, weakness, light-headedness and headaches.   Hematological:  Negative for adenopathy. Does not bruise/bleed easily.     Past Medical History   Past Medical History:   Diagnosis Date    Closed fracture of left distal radius 07/14/2023    Cluster headache     Concussion     Concussion with no loss of consciousness 12/12/2018    History of 2019 novel coronavirus disease (COVID-19) 04/29/2020    Hypertension     Neurogenic claudication     VENKAT (obstructive sleep apnea)     Stress fracture of right ankle 09/28/2022    Vitamin D deficiency      History reviewed. No pertinent surgical history.  Family History   Problem Relation Age of Onset    Hypertension Mother     No Known Problems Father     No Known Problems Sister     No Known Problems Brother     No Known Problems Son     No Known Problems Daughter      Current Outpatient Medications on File Prior to Visit   Medication Sig Dispense Refill    acetaminophen (TYLENOL) 500 mg tablet Take 1 tablet (500 mg total) by mouth every 6 (six) hours as needed for mild pain (Patient not taking: Reported on 6/24/2024) 30 tablet 0    cholecalciferol 1,000 units tablet Take 1 tablet (1,000 Units total) by mouth daily (Patient not taking: Reported on 6/12/2024) 90 tablet 1    magnesium Oxide (MAG-OX) 400 mg TABS Take 1 tablet (400 mg total) by mouth daily 90 tablet 0    methylPREDNISolone 4 MG tablet therapy pack Use as directed on package 21 tablet 0    pravastatin (PRAVACHOL) 20 mg tablet Take 1 tablet (20 mg total) by mouth daily (Patient not taking: Reported on 6/24/2024) 90 tablet 3    SUMAtriptan (IMITREX) 20 MG/ACT nasal spray 1 spray (20 mg total) into each nostril once as needed for migraine for up to 6 doses ; if headache returns/persists, may repeat dose once after 2 hours; MAX 40 mg/24 hours (Patient not taking: Reported on 6/24/2024) 6 each 0    traMADol (ULTRAM) 50 mg tablet Take 1  tablet (50 mg total) by mouth every 6 (six) hours as needed for moderate pain or severe pain for up to 10 days 15 tablet 0    verapamil (CALAN) 80 mg tablet Take 1 tablet (80 mg total) by mouth 3 (three) times a day (Patient not taking: Reported on 6/12/2024) 180 tablet 0    vitamin B-12 (VITAMIN B-12) 1,000 mcg tablet Take 1 tablet (1,000 mcg total) by mouth daily (Patient not taking: Reported on 6/12/2024) 30 tablet 2    [DISCONTINUED] Diclofenac Sodium (VOLTAREN) 1 % Apply 2 g topically 4 (four) times a day (Patient not taking: Reported on 4/24/2024) 100 g 3    [DISCONTINUED] ergocalciferol (VITAMIN D2) 50,000 units Take 1 capsule (50,000 Units total) by mouth once a week for 8 doses (Patient not taking: Reported on 6/24/2024) 4 capsule 1    [DISCONTINUED] ibuprofen (MOTRIN) 400 mg tablet Take 1 tablet (400 mg total) by mouth every 6 (six) hours as needed for mild pain or headaches (Patient not taking: Reported on 6/24/2024) 90 tablet 1    [DISCONTINUED] ibuprofen (MOTRIN) 800 mg tablet Take 1 tablet (800 mg total) by mouth 3 (three) times a day (Patient not taking: Reported on 6/12/2024) 21 tablet 0    [DISCONTINUED] methocarbamol (ROBAXIN) 500 mg tablet Take 1 tablet (500 mg total) by mouth 2 (two) times a day as needed for muscle spasms Por la noche 28 tablet 0    [DISCONTINUED] methylPREDNISolone 4 MG tablet therapy pack Use as directed on package (Patient not taking: Reported on 6/24/2024) 21 each 0     No current facility-administered medications on file prior to visit.   No Known Allergies   Current Outpatient Medications on File Prior to Visit   Medication Sig Dispense Refill    acetaminophen (TYLENOL) 500 mg tablet Take 1 tablet (500 mg total) by mouth every 6 (six) hours as needed for mild pain (Patient not taking: Reported on 6/24/2024) 30 tablet 0    cholecalciferol 1,000 units tablet Take 1 tablet (1,000 Units total) by mouth daily (Patient not taking: Reported on 6/12/2024) 90 tablet 1    magnesium  Oxide (MAG-OX) 400 mg TABS Take 1 tablet (400 mg total) by mouth daily 90 tablet 0    methylPREDNISolone 4 MG tablet therapy pack Use as directed on package 21 tablet 0    pravastatin (PRAVACHOL) 20 mg tablet Take 1 tablet (20 mg total) by mouth daily (Patient not taking: Reported on 6/24/2024) 90 tablet 3    SUMAtriptan (IMITREX) 20 MG/ACT nasal spray 1 spray (20 mg total) into each nostril once as needed for migraine for up to 6 doses ; if headache returns/persists, may repeat dose once after 2 hours; MAX 40 mg/24 hours (Patient not taking: Reported on 6/24/2024) 6 each 0    traMADol (ULTRAM) 50 mg tablet Take 1 tablet (50 mg total) by mouth every 6 (six) hours as needed for moderate pain or severe pain for up to 10 days 15 tablet 0    verapamil (CALAN) 80 mg tablet Take 1 tablet (80 mg total) by mouth 3 (three) times a day (Patient not taking: Reported on 6/12/2024) 180 tablet 0    vitamin B-12 (VITAMIN B-12) 1,000 mcg tablet Take 1 tablet (1,000 mcg total) by mouth daily (Patient not taking: Reported on 6/12/2024) 30 tablet 2    [DISCONTINUED] Diclofenac Sodium (VOLTAREN) 1 % Apply 2 g topically 4 (four) times a day (Patient not taking: Reported on 4/24/2024) 100 g 3    [DISCONTINUED] ergocalciferol (VITAMIN D2) 50,000 units Take 1 capsule (50,000 Units total) by mouth once a week for 8 doses (Patient not taking: Reported on 6/24/2024) 4 capsule 1    [DISCONTINUED] ibuprofen (MOTRIN) 400 mg tablet Take 1 tablet (400 mg total) by mouth every 6 (six) hours as needed for mild pain or headaches (Patient not taking: Reported on 6/24/2024) 90 tablet 1    [DISCONTINUED] ibuprofen (MOTRIN) 800 mg tablet Take 1 tablet (800 mg total) by mouth 3 (three) times a day (Patient not taking: Reported on 6/12/2024) 21 tablet 0    [DISCONTINUED] methocarbamol (ROBAXIN) 500 mg tablet Take 1 tablet (500 mg total) by mouth 2 (two) times a day as needed for muscle spasms Por la noche 28 tablet 0    [DISCONTINUED] methylPREDNISolone 4  MG tablet therapy pack Use as directed on package (Patient not taking: Reported on 2024) 21 each 0     No current facility-administered medications on file prior to visit.      Social History     Tobacco Use    Smoking status: Former     Current packs/day: 0.00     Average packs/day: 1 pack/day for 15.0 years (15.0 ttl pk-yrs)     Types: Cigarettes     Start date:      Quit date: 2017     Years since quittin.6    Smokeless tobacco: Never   Vaping Use    Vaping status: Never Used   Substance and Sexual Activity    Alcohol use: Not Currently     Comment: weekends only and half a pack    Drug use: No    Sexual activity: Yes     Partners: Female     Objective     /89 (BP Location: Right arm, Patient Position: Sitting, Cuff Size: Large)   Pulse 61   Temp 98 °F (36.7 °C) (Temporal)   Wt 112 kg (247 lb 3.2 oz)   SpO2 98%   BMI 35.47 kg/m²     Physical Exam  Vitals and nursing note reviewed.   Constitutional:       General: He is awake. He is not in acute distress.     Appearance: Normal appearance. He is well-developed and well-groomed. He is obese. He is not ill-appearing.   HENT:      Head: Normocephalic and atraumatic.   Eyes:      General: No scleral icterus.     Conjunctiva/sclera: Conjunctivae normal.   Cardiovascular:      Rate and Rhythm: Normal rate and regular rhythm.      Pulses: Normal pulses.           Radial pulses are 2+ on the right side and 2+ on the left side.        Dorsalis pedis pulses are 2+ on the right side and 2+ on the left side.      Heart sounds: Normal heart sounds. No murmur heard.  Pulmonary:      Effort: Pulmonary effort is normal. No respiratory distress.      Breath sounds: Normal breath sounds and air entry. No decreased air movement. No decreased breath sounds, wheezing, rhonchi or rales.   Abdominal:      General: Abdomen is flat. Bowel sounds are normal. There is no distension.      Palpations: Abdomen is soft. There is no mass.      Tenderness: There is no  abdominal tenderness. There is no guarding or rebound.      Hernia: No hernia is present.   Musculoskeletal:      Lumbar back: Tenderness (left lower lumbar paraspinals, piriformis, and generalized left leg) present. No swelling, edema, deformity, signs of trauma, lacerations, spasms or bony tenderness. Normal range of motion. Positive left straight leg raise test. Negative right straight leg raise test. No scoliosis.      Right lower leg: No edema.      Left lower leg: No edema.   Skin:     General: Skin is warm.      Coloration: Skin is not jaundiced.      Findings: No rash.   Neurological:      General: No focal deficit present.      Mental Status: He is alert and oriented to person, place, and time. Mental status is at baseline.      Sensory: Sensation is intact.      Motor: Motor function is intact. No weakness, tremor, atrophy or abnormal muscle tone.      Coordination: Coordination is intact.      Gait: Gait abnormal (non-weight bearing left leg, using crutches).      Comments: Lower extremity strength 5/5, equal and symmetric, normal tone. No foot drop. Normal sensation.   Psychiatric:         Attention and Perception: Attention normal.         Mood and Affect: Mood and affect normal.         Speech: Speech normal.         Behavior: Behavior normal. Behavior is cooperative.       Administrative Statements   I have spent a total time of 30 minutes in caring for this patient on the day of the visit/encounter including Diagnostic results, Prognosis, Risks and benefits of tx options, Instructions for management, Patient and family education, Importance of tx compliance, Risk factor reductions, Impressions, Counseling / Coordination of care, Reviewing / ordering tests, medicine, procedures  , and Obtaining or reviewing history  .

## 2024-08-13 NOTE — TELEPHONE ENCOUNTER
Pts SO called in to make an appt with Dr. Goldberg for worsening symptoms. Pt is having a very melton time walking along with pain.     Offered to get SO and  to nurse team wife asked that I have the nurses call pt as she was not with him.    Please assist.

## 2024-08-13 NOTE — TELEPHONE ENCOUNTER
I called and spoke with patient spouse to inform her that provider does not have a sooner appointment at this time. Patient was seen in ED and was given a return back to work note. Patient spouse hung up the call.

## 2024-08-14 ENCOUNTER — TELEPHONE (OUTPATIENT)
Dept: NEUROSURGERY | Facility: CLINIC | Age: 44
End: 2024-08-14

## 2024-08-14 NOTE — TELEPHONE ENCOUNTER
This RN phoned patient utilizng the  services and in preparation to leave deatailed VM should patient not be available. Patient phone did not have voice mail set up.  Phone call then placed to patient significant other, Lauren who is on his communication form.  She too was unavailable however did have a voicemail set up and detailed message was left by      Detailed message stated that this is [facility] office with Dr. Goldberg calling to follow up with you regarding your symptoms.  It looks as if you saw Dr. Goldberg for a consult in June with the plan to follow up with Pain Management and Physical therapy.  I would like to discuss your s/s and see if you have been able to followed up as planned at your last visit.  Please give a call back to this office at [phone] so that we can assist.      [id number] utilized for entire process was thanked for her assistance.

## 2024-08-14 NOTE — TELEPHONE ENCOUNTER
[id number] and patient significant other, Lauren were transferred to this RN       Patient has not followed up with Pain Management because of his work schedule.       utilized to explain that patient needs to participate in the plan that was made with him and Dr. Goldberg.  That we need to take conservative measures as they often do work.  We can not just do surgery without trying these things first when it is not an emergent scenario.      Call was disconnected for unknown reason after this information was communicated by .   utilized to call patient  Back and no answer and not voice mail available at this time.       was thanked for her assistance.

## 2024-08-15 NOTE — TELEPHONE ENCOUNTER
Intepreter [place] [phone] used to interpret the call, pt spouse seeking treatment for spouses pain, pt visited ED since visit but not S&P or PT. Offered to transfer to S&P immediately with  on line. Pt spouse Shofelix chatterjee transferred to  S&P.

## 2024-08-21 NOTE — TELEPHONE ENCOUNTER
Call placed to the patient per [facility] referral.     V/M left in Malay for patient to call back. Phone number and hours of business provided.      This is the 2nd attempt to reach the patient.  Will close referral per protocol.    Pt established with Liane at [facility].

## 2024-08-22 ENCOUNTER — TELEPHONE (OUTPATIENT)
Dept: INTERNAL MEDICINE CLINIC | Facility: CLINIC | Age: 44
End: 2024-08-22

## 2024-09-16 ENCOUNTER — TELEPHONE (OUTPATIENT)
Dept: INTERNAL MEDICINE CLINIC | Facility: CLINIC | Age: 44
End: 2024-09-16

## 2024-09-16 NOTE — TELEPHONE ENCOUNTER
This patient is scheduled for a clinic appointment on 9/18/2024-she is currently scheduled with -please change him to be seen by one of the residents with my supervision as we have been seeing this patient on a regular basis and his significant other requested I see the patient-he will not  the phone and only speaks Frisian-it is best to contact his significant other-Lauren--[phone] thank you very much- Dr. Salgado

## 2024-09-18 ENCOUNTER — TELEPHONE (OUTPATIENT)
Dept: INTERNAL MEDICINE CLINIC | Facility: CLINIC | Age: 44
End: 2024-09-18

## 2024-09-18 ENCOUNTER — OFFICE VISIT (OUTPATIENT)
Dept: INTERNAL MEDICINE CLINIC | Facility: CLINIC | Age: 44
End: 2024-09-18

## 2024-09-18 VITALS
DIASTOLIC BLOOD PRESSURE: 79 MMHG | HEART RATE: 66 BPM | OXYGEN SATURATION: 95 % | HEIGHT: 69 IN | SYSTOLIC BLOOD PRESSURE: 130 MMHG | TEMPERATURE: 98 F | WEIGHT: 245.13 LBS | BODY MASS INDEX: 36.31 KG/M2

## 2024-09-18 DIAGNOSIS — R51.9 NONINTRACTABLE HEADACHE, UNSPECIFIED CHRONICITY PATTERN, UNSPECIFIED HEADACHE TYPE: ICD-10-CM

## 2024-09-18 DIAGNOSIS — R29.818 NEUROGENIC CLAUDICATION: ICD-10-CM

## 2024-09-18 DIAGNOSIS — E78.5 HYPERLIPIDEMIA, UNSPECIFIED HYPERLIPIDEMIA TYPE: ICD-10-CM

## 2024-09-18 DIAGNOSIS — G44.011 INTRACTABLE EPISODIC CLUSTER HEADACHE: ICD-10-CM

## 2024-09-18 DIAGNOSIS — M43.17 SPONDYLOLISTHESIS OF LUMBOSACRAL REGION: Primary | ICD-10-CM

## 2024-09-18 PROCEDURE — 99214 OFFICE O/P EST MOD 30 MIN: CPT | Performed by: INTERNAL MEDICINE

## 2024-09-18 RX ORDER — VERAPAMIL HYDROCHLORIDE 80 MG/1
80 TABLET ORAL 3 TIMES DAILY
Qty: 180 TABLET | Refills: 0 | Status: SHIPPED | OUTPATIENT
Start: 2024-09-18 | End: 2024-11-17

## 2024-09-18 RX ORDER — GABAPENTIN 100 MG/1
100 CAPSULE ORAL 3 TIMES DAILY
Qty: 270 CAPSULE | Refills: 1 | Status: SHIPPED | OUTPATIENT
Start: 2024-09-18 | End: 2025-03-17

## 2024-09-18 RX ORDER — ATORVASTATIN CALCIUM 20 MG/1
20 TABLET, FILM COATED ORAL DAILY
Qty: 30 TABLET | Refills: 6 | Status: SHIPPED | OUTPATIENT
Start: 2024-09-18

## 2024-09-18 NOTE — TELEPHONE ENCOUNTER
Folder Color-Red    Name of Form-FMLA-Certification of Health Provider     Form to be filled out by-Dr Huntley    Form to be Faxed [phone]

## 2024-09-18 NOTE — PROGRESS NOTES
Ambulatory Visit  Name: Efra Machado      : 1980      MRN: [id number]  Encounter Provider: Ant Huntley DO  Encounter Date: 2024   [facility]    Assessment & Plan  Spondylolisthesis of lumbosacral region  L5 on S1 spondylolisthesis noted on last Lumbar MRI. There is associated moderate-severe neural foraminal narrowing of the L5 nerve roots. There is also mild foraminal narrowing at L3/L4 and L4/L5.   Orders:    Ambulatory Referral to Comprehensive Spine PT; Future    Ambulatory referral to Spine & Pain Management; Future    XR foot 3+ vw left; Future   Patient had appointment with neurosurgery before who recommends a trial of PT and Pain Management. He had an appointment with PT on 24 but this was reportedly cancelled by them because of insurance reasons. He was unable to make his appointment with pain management because of work obligations. For now will start gabapentin 100mg TID. Will complete FMLA form as appropriate. Will be re-referring to pain management and PT.  Nonintractable headache, unspecified chronicity pattern, unspecified headache type  He was previously prescribed verapamil and sumatriptan but he has not used these. He does have a previous CT C spine ordered but this is incomplete.       Will be initiating verapamil.   Hyperlipidemia, unspecified hyperlipidemia type  Coronary calcium score of 10. Patient reports that he forgets to take his prescribed pravastatin. Most recent lipid panel showing   Orders:    atorvastatin (LIPITOR) 20 mg tablet; Take 1 tablet (20 mg total) by mouth daily    Neurogenic claudication    Orders:    gabapentin (NEURONTIN) 100 mg capsule; Take 1 capsule (100 mg total) by mouth 3 (three) times a day    Intractable episodic cluster headache    Orders:    verapamil (CALAN) 80 mg tablet; Take 1 tablet (80 mg total) by mouth 3 (three) times a day         History of Present Illness       This  morning the patient is experiencing left leg and foot pain. This pain is chronic. There are no acute changes to the pain. He describes the pain as like pins and needles. Pain is relieved with relaxing and worsened with walking. Pain is improved when leaning forward over a shopping cart. 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 He gets some relief of pain with naproxen. He has taken steroids and gabapentin for this pain but has not found it relieving.     He is also complaining of right sided headache. Pain goes from his forehead to his neck on the right sided. Pain occurs intermittently throughout the day for minutes at a time and is an 8/10 on the severity scale when it occurs. He gets some relief of this pain with advil. He describes this pain as a pulling sensation. It does not feel like numbness, tingling, throbbing, or shooting. This pain has been going on for a few months.       Review of Systems   Constitutional:  Negative for chills, fatigue, fever and unexpected weight change.   HENT:  Negative for dental problem, ear pain and hearing loss.    Respiratory:  Negative for cough and shortness of breath.    Cardiovascular:  Negative for chest pain, palpitations and leg swelling.   Gastrointestinal:  Negative for abdominal pain, constipation, diarrhea, nausea and vomiting.   Musculoskeletal:  Positive for back pain. Negative for joint swelling, neck pain and neck stiffness.   Neurological:  Positive for dizziness (feeling of occasionally losing balance at work without falls), weakness (Lower extremity) and headaches. Negative for light-headedness.     Past Medical History:   Diagnosis Date    Closed fracture of left distal radius 07/14/2023    Cluster headache     Concussion     Concussion with no loss of consciousness 12/12/2018    History of 2019 novel coronavirus disease (COVID-19) 04/29/2020    Hypertension     Neurogenic claudication     VENKAT (obstructive sleep apnea)      Stress fracture of right ankle 2022    Vitamin D deficiency      History reviewed. No pertinent surgical history.  Family History   Problem Relation Age of Onset    Hypertension Mother     No Known Problems Father     No Known Problems Sister     No Known Problems Brother     No Known Problems Son     No Known Problems Daughter      Social History     Tobacco Use    Smoking status: Former     Current packs/day: 0.00     Average packs/day: 1 pack/day for 15.0 years (15.0 ttl pk-yrs)     Types: Cigarettes     Start date:      Quit date: 2017     Years since quittin.7    Smokeless tobacco: Never   Vaping Use    Vaping status: Never Used   Substance and Sexual Activity    Alcohol use: Not Currently     Comment: weekends only and half a pack    Drug use: No    Sexual activity: Yes     Partners: Female     Current Outpatient Medications on File Prior to Visit   Medication Sig    acetaminophen (TYLENOL) 500 mg tablet Take 1 tablet (500 mg total) by mouth every 6 (six) hours as needed for mild pain (Patient not taking: Reported on 2024)    cholecalciferol 1,000 units tablet Take 1 tablet (1,000 Units total) by mouth daily (Patient not taking: Reported on 2024)    magnesium Oxide (MAG-OX) 400 mg TABS Take 1 tablet (400 mg total) by mouth daily    methylPREDNISolone 4 MG tablet therapy pack Use as directed on package    SUMAtriptan (IMITREX) 20 MG/ACT nasal spray 1 spray (20 mg total) into each nostril once as needed for migraine for up to 6 doses ; if headache returns/persists, may repeat dose once after 2 hours; MAX 40 mg/24 hours (Patient not taking: Reported on 2024)    vitamin B-12 (VITAMIN B-12) 1,000 mcg tablet Take 1 tablet (1,000 mcg total) by mouth daily (Patient not taking: Reported on 2024)    [DISCONTINUED] gabapentin (NEURONTIN) 300 mg capsule Take 1 capsule (300 mg total) by mouth 3 (three) times a day    [DISCONTINUED] pravastatin (PRAVACHOL) 20 mg tablet Take 1 tablet (20 mg  "total) by mouth daily (Patient not taking: Reported on 6/24/2024)    [DISCONTINUED] verapamil (CALAN) 80 mg tablet Take 1 tablet (80 mg total) by mouth 3 (three) times a day (Patient not taking: Reported on 6/12/2024)     No Known Allergies    There is no immunization history on file for this patient.  Objective     /79 (BP Location: Left arm, Patient Position: Sitting, Cuff Size: Large)   Pulse 66   Temp 98 °F (36.7 °C) (Temporal)   Ht 5' 9\" (1.753 m)   Wt 111 kg (245 lb 2 oz)   SpO2 95%   BMI 36.20 kg/m²     Physical Exam  Vitals reviewed.   Constitutional:       General: He is not in acute distress.     Appearance: Normal appearance. He is obese. He is not ill-appearing or diaphoretic.   HENT:      Head: Normocephalic.   Eyes:      General: No scleral icterus.     Extraocular Movements: Extraocular movements intact.   Cardiovascular:      Rate and Rhythm: Normal rate.      Pulses: Normal pulses.      Heart sounds: Normal heart sounds. No murmur heard.     No friction rub. No gallop.   Pulmonary:      Effort: Pulmonary effort is normal. No respiratory distress.      Breath sounds: Normal breath sounds. No stridor. No wheezing, rhonchi or rales.   Abdominal:      General: There is no distension.      Palpations: Abdomen is soft.      Tenderness: There is no abdominal tenderness.   Musculoskeletal:      Cervical back: No rigidity or tenderness.      Right lower leg: No edema.      Left lower leg: No deformity. No edema.      Left foot: Swelling and tenderness present.   Skin:     General: Skin is warm and dry.      Coloration: Skin is not jaundiced or pale.   Neurological:      Mental Status: He is alert.      Motor: Weakness present.      Gait: Gait abnormal.      Deep Tendon Reflexes:      Reflex Scores:       Achilles reflexes are 2+ on the right side and 1+ on the left side.     Comments: Decreased dorsiflexion of left foot   Psychiatric:         Mood and Affect: Mood normal.         Behavior: " Behavior normal.

## 2024-09-18 NOTE — ASSESSMENT & PLAN NOTE
Orders:    gabapentin (NEURONTIN) 100 mg capsule; Take 1 capsule (100 mg total) by mouth 3 (three) times a day

## 2024-09-18 NOTE — ASSESSMENT & PLAN NOTE
Coronary calcium score of 10. Patient reports that he forgets to take his prescribed pravastatin. Most recent lipid panel showing   Orders:    atorvastatin (LIPITOR) 20 mg tablet; Take 1 tablet (20 mg total) by mouth daily

## 2024-09-24 ENCOUNTER — TELEPHONE (OUTPATIENT)
Dept: NEUROLOGY | Facility: CLINIC | Age: 44
End: 2024-09-24

## 2024-09-24 ENCOUNTER — HOSPITAL ENCOUNTER (EMERGENCY)
Facility: HOSPITAL | Age: 44
Discharge: HOME | End: 2024-09-24
Attending: EMERGENCY MEDICINE
Payer: COMMERCIAL

## 2024-09-24 VITALS
WEIGHT: 240.3 LBS | RESPIRATION RATE: 18 BRPM | BODY MASS INDEX: 35.59 KG/M2 | SYSTOLIC BLOOD PRESSURE: 128 MMHG | DIASTOLIC BLOOD PRESSURE: 77 MMHG | OXYGEN SATURATION: 96 % | HEART RATE: 99 BPM | HEIGHT: 69 IN | TEMPERATURE: 98.6 F

## 2024-09-24 DIAGNOSIS — J02.9 PHARYNGITIS: Primary | ICD-10-CM

## 2024-09-24 PROCEDURE — 99284 EMERGENCY DEPT VISIT MOD MDM: CPT | Performed by: PHYSICIAN ASSISTANT

## 2024-09-24 PROCEDURE — 96372 THER/PROPH/DIAG INJ SC/IM: CPT

## 2024-09-24 PROCEDURE — 99282 EMERGENCY DEPT VISIT SF MDM: CPT

## 2024-09-24 RX ORDER — AMOXICILLIN 500 MG/1
500 TABLET, FILM COATED ORAL 2 TIMES DAILY
Qty: 20 TABLET | Refills: 0 | Status: SHIPPED | OUTPATIENT
Start: 2024-09-24 | End: 2024-10-04

## 2024-09-24 RX ORDER — KETOROLAC TROMETHAMINE 30 MG/ML
15 INJECTION, SOLUTION INTRAMUSCULAR; INTRAVENOUS ONCE
Status: COMPLETED | OUTPATIENT
Start: 2024-09-24 | End: 2024-09-24

## 2024-09-24 RX ADMIN — DEXAMETHASONE SODIUM PHOSPHATE 10 MG: 10 INJECTION, SOLUTION INTRAMUSCULAR; INTRAVENOUS at 08:55

## 2024-09-24 RX ADMIN — KETOROLAC TROMETHAMINE 15 MG: 30 INJECTION, SOLUTION INTRAMUSCULAR; INTRAVENOUS at 08:56

## 2024-09-24 NOTE — ED PROVIDER NOTES
1. Pharyngitis      ED Disposition       ED Disposition   Discharge    Condition   Stable    Date/Time   Tue Sep 24, 2024  8:49 AM    Comment   Efra Machado discharge to home/self care.                   Assessment & Plan       Medical Decision Making  44y.o male presents to the ER for sore throat for 3 days. Vitals are stable. Patient is in no acute distress. On exam, moist mucous membranes seen. Throat is red and swollen with tonsillar exudate seen. No abscess seen. Uvula is midline. No trismus. No trouble swallowing or handling secretions. No neck swelling. Breathing is non-labored. No tachypnea or accessory muscle use. Lungs are clear. Heart is regular rate and rhythm. Abdomen is soft and non-tender. No guarding, rigidity, distention or pulsatile masses palpated. DDX consists of but not limited to: viral syndrome, strep, mono, covid. Will give Toradol and Decadron here for symptoms.    The management plan was discussed in detail with the patient at bedside and all questions were answered.  Prior to discharge, we provided both verbal and written instructions.  We discussed with the patient the signs and symptoms for which to return to the emergency department.  All questions were answered and patient was comfortable with the plan of care and discharged to home.  Instructed the patient to follow up with the primary care provider and/or specialist provided and their written instructions.  The patient verbalized understanding of our discussion and plan of care, and agrees to return to the Emergency Department for concerns and progression of illness.    At discharge, I instructed the patient to:  -follow up with pcp  -take Tylenol or Motrin for pain or fever  -take Amoxicillin as prescribed  -rest and drink plenty of fluids  -return to the ER if symptoms worsened or new symptoms arose  Patient agreed to this plan and was stable at time of discharge.     Problems Addressed:  Pharyngitis: acute illness or  injury    Amount and/or Complexity of Data Reviewed  Independent Historian:      Details: Patient is historian    Risk  Prescription drug management.                     Medications   ketorolac (TORADOL) injection 15 mg (15 mg Intramuscular Given 9/24/24 0856)   dexamethasone oral liquid 10 mg 1 mL (10 mg Oral Given 9/24/24 0855)       History of Present Illness       44y.o male with PMH of cluster headache, concussion, HTN, neurogenic claudication, VENKAT and vitamin D deficiency presents to the ER for sore throat for 3 days. Patient has been taking Naproxen for symptoms. He describes his pain as sharp and non-radiating. Pain is constant. He denies sick contacts or recent travel. Associated symptoms: chills. He denies fever, rhinorrhea/congestion, cough, chest pain, dyspnea, N/V/D, abdominal pain or weakness.      History provided by:  Patient   used: No      Chief Complaint   Patient presents with    Sore Throat     Pt reports sore throat since Sunday, painful to swallow.      Past Medical History:   Diagnosis Date    Closed fracture of left distal radius 07/14/2023    Cluster headache     Concussion     Concussion with no loss of consciousness 12/12/2018    History of 2019 novel coronavirus disease (COVID-19) 04/29/2020    Hypertension     Neurogenic claudication     VENKAT (obstructive sleep apnea)     Stress fracture of right ankle 09/28/2022    Vitamin D deficiency      Prior to Admission medications    Medication Sig Start Date End Date Taking? Authorizing Provider   amoxicillin (AMOXIL) 500 MG tablet Take 1 tablet (500 mg total) by mouth 2 (two) times a day for 10 days 9/24/24 10/4/24 Yes Mer Escobar PA-C   acetaminophen (TYLENOL) 500 mg tablet Take 1 tablet (500 mg total) by mouth every 6 (six) hours as needed for mild pain  Patient not taking: Reported on 6/24/2024 6/4/24   Bjorn Sylvester PA-C   atorvastatin (LIPITOR) 20 mg tablet Take 1 tablet (20 mg total) by mouth daily 9/18/24    Ant Huntley DO   cholecalciferol 1,000 units tablet Take 1 tablet (1,000 Units total) by mouth daily  Patient not taking: Reported on 6/12/2024 4/24/24   Nikolas Arnold DO   gabapentin (NEURONTIN) 100 mg capsule Take 1 capsule (100 mg total) by mouth 3 (three) times a day 9/18/24 3/17/25  Ant Huntley DO   magnesium Oxide (MAG-OX) 400 mg TABS Take 1 tablet (400 mg total) by mouth daily 6/24/24   Nichelle Martínez MD   methylPREDNISolone 4 MG tablet therapy pack Use as directed on package 8/11/24   Alanna Rudolph DO   SUMAtriptan (IMITREX) 20 MG/ACT nasal spray 1 spray (20 mg total) into each nostril once as needed for migraine for up to 6 doses ; if headache returns/persists, may repeat dose once after 2 hours; MAX 40 mg/24 hours  Patient not taking: Reported on 6/24/2024 6/5/24   Emilie Soyeon Kim, MD   verapamil (CALAN) 80 mg tablet Take 1 tablet (80 mg total) by mouth 3 (three) times a day 9/18/24 11/17/24  Ant Huntley DO   vitamin B-12 (VITAMIN B-12) 1,000 mcg tablet Take 1 tablet (1,000 mcg total) by mouth daily  Patient not taking: Reported on 6/12/2024 3/6/24 6/4/24  Nikolas Arnold DO     History reviewed. No pertinent surgical history.    Review of Systems   Constitutional:  Positive for chills. Negative for activity change, appetite change and fever.   HENT:  Positive for sore throat. Negative for congestion, drooling, ear discharge, ear pain, facial swelling and rhinorrhea.    Eyes:  Negative for redness.   Respiratory:  Negative for cough and shortness of breath.    Cardiovascular:  Negative for chest pain.   Gastrointestinal:  Negative for abdominal pain, diarrhea, nausea and vomiting.   Musculoskeletal:  Negative for neck stiffness.   Skin:  Negative for rash.   Allergic/Immunologic: Negative for food allergies.   Neurological:  Negative for weakness and numbness.           Objective     ED Triage Vitals [09/24/24 0816]   Temperature Pulse Blood Pressure Respirations SpO2 Patient  Position - Orthostatic VS   98.6 °F (37 °C) 99 128/77 18 96 % Sitting      Temp Source Heart Rate Source BP Location FiO2 (%) Pain Score    Oral Monitor Right arm -- 10 - Worst Possible Pain        Physical Exam  Vitals and nursing note reviewed.   Constitutional:       General: He is not in acute distress.     Appearance: He is not toxic-appearing.   HENT:      Head: Normocephalic and atraumatic.      Right Ear: Tympanic membrane, ear canal and external ear normal. No drainage, swelling or tenderness. No foreign body. No hemotympanum. Tympanic membrane is not erythematous.      Left Ear: Tympanic membrane, ear canal and external ear normal. No drainage, swelling or tenderness. No foreign body. No hemotympanum. Tympanic membrane is not erythematous.      Nose: Nose normal.      Mouth/Throat:      Lips: Pink. No lesions.      Mouth: Mucous membranes are moist.      Pharynx: Uvula midline. Pharyngeal swelling and posterior oropharyngeal erythema present. No uvula swelling.      Tonsils: Tonsillar exudate present. No tonsillar abscesses.   Eyes:      Conjunctiva/sclera: Conjunctivae normal.   Neck:      Trachea: No tracheal deviation.   Cardiovascular:      Rate and Rhythm: Normal rate and regular rhythm.      Heart sounds: Normal heart sounds, S1 normal and S2 normal. No murmur heard.     No friction rub. No gallop.   Pulmonary:      Effort: Pulmonary effort is normal. No respiratory distress.      Breath sounds: Normal breath sounds. No decreased breath sounds, wheezing, rhonchi or rales.   Chest:      Chest wall: No tenderness.   Abdominal:      General: Bowel sounds are normal. There is no distension.      Palpations: Abdomen is soft.      Tenderness: There is no abdominal tenderness. There is no guarding or rebound.   Musculoskeletal:      Cervical back: Normal range of motion and neck supple.   Skin:     General: Skin is warm and dry.      Findings: No rash.   Neurological:      Mental Status: He is alert.       GCS: GCS eye subscore is 4. GCS verbal subscore is 5. GCS motor subscore is 6.   Psychiatric:         Mood and Affect: Mood normal.         Labs Reviewed - No data to display  No orders to display       Procedures    ED Medication and Procedure Management   Prior to Admission Medications   Prescriptions Last Dose Informant Patient Reported? Taking?   SUMAtriptan (IMITREX) 20 MG/ACT nasal spray  Self No No   Si spray (20 mg total) into each nostril once as needed for migraine for up to 6 doses ; if headache returns/persists, may repeat dose once after 2 hours; MAX 40 mg/24 hours   Patient not taking: Reported on 2024   acetaminophen (TYLENOL) 500 mg tablet  Self No No   Sig: Take 1 tablet (500 mg total) by mouth every 6 (six) hours as needed for mild pain   Patient not taking: Reported on 2024   atorvastatin (LIPITOR) 20 mg tablet   No No   Sig: Take 1 tablet (20 mg total) by mouth daily   cholecalciferol 1,000 units tablet  Self No No   Sig: Take 1 tablet (1,000 Units total) by mouth daily   Patient not taking: Reported on 2024   gabapentin (NEURONTIN) 100 mg capsule   No No   Sig: Take 1 capsule (100 mg total) by mouth 3 (three) times a day   magnesium Oxide (MAG-OX) 400 mg TABS   No No   Sig: Take 1 tablet (400 mg total) by mouth daily   methylPREDNISolone 4 MG tablet therapy pack   No No   Sig: Use as directed on package   verapamil (CALAN) 80 mg tablet   No No   Sig: Take 1 tablet (80 mg total) by mouth 3 (three) times a day   vitamin B-12 (VITAMIN B-12) 1,000 mcg tablet  Self No No   Sig: Take 1 tablet (1,000 mcg total) by mouth daily   Patient not taking: Reported on 2024      Facility-Administered Medications: None     Discharge Medication List as of 2024  8:51 AM        START taking these medications    Details   amoxicillin (AMOXIL) 500 MG tablet Take 1 tablet (500 mg total) by mouth 2 (two) times a day for 10 days, Starting 2024, Until Fri 10/4/2024, Normal            CONTINUE these medications which have NOT CHANGED    Details   acetaminophen (TYLENOL) 500 mg tablet Take 1 tablet (500 mg total) by mouth every 6 (six) hours as needed for mild pain, Starting Tue 6/4/2024, Normal      atorvastatin (LIPITOR) 20 mg tablet Take 1 tablet (20 mg total) by mouth daily, Starting Wed 9/18/2024, Normal      cholecalciferol 1,000 units tablet Take 1 tablet (1,000 Units total) by mouth daily, Starting Wed 4/24/2024, Normal      gabapentin (NEURONTIN) 100 mg capsule Take 1 capsule (100 mg total) by mouth 3 (three) times a day, Starting Wed 9/18/2024, Until Mon 3/17/2025, Normal      magnesium Oxide (MAG-OX) 400 mg TABS Take 1 tablet (400 mg total) by mouth daily, Starting Mon 6/24/2024, Normal      methylPREDNISolone 4 MG tablet therapy pack Use as directed on package, Normal      SUMAtriptan (IMITREX) 20 MG/ACT nasal spray 1 spray (20 mg total) into each nostril once as needed for migraine for up to 6 doses ; if headache returns/persists, may repeat dose once after 2 hours; MAX 40 mg/24 hours, Starting Wed 6/5/2024, Normal      verapamil (CALAN) 80 mg tablet Take 1 tablet (80 mg total) by mouth 3 (three) times a day, Starting Wed 9/18/2024, Until Sun 11/17/2024, Normal      vitamin B-12 (VITAMIN B-12) 1,000 mcg tablet Take 1 tablet (1,000 mcg total) by mouth daily, Starting Wed 3/6/2024, Until Tue 6/4/2024, Normal           No discharge procedures on file.     Mer Escobar PA-C  09/24/24 2807

## 2024-09-24 NOTE — Clinical Note
Efra Machado was seen and treated in our emergency department on 9/24/2024.    No restrictions            Diagnosis:     Efra  .    He may return on this date: 09/26/2024         If you have any questions or concerns, please don't hesitate to call.      Mer Escobar PA-C    ______________________________           _______________          _______________  Hospital Representative                              Date                                Time

## 2024-09-24 NOTE — DISCHARGE INSTRUCTIONS
DISCHARGE INSTRUCTIONS:    FOLLOW UP WITH YOUR PRIMARY CARE PROVIDER OR THE [facility]. MAKE AN APPOINTMENT TO BE SEEN.     TAKE TYLENOL OR MOTRIN FOR PAIN OR FEVER.    TAKE ANTIBIOTICS AS PRESCRIBED. IF RASH, SHORTNESS OF BREATH OR TROUBLE SWALLOWING, STOP TAKING THE MEDICATION AND BE SEEN.     REST AND DRINK PLENTY OF FLUIDS.    IF SYMPTOMS WORSEN OR NEW SYMPTOMS ARISE, RETURN TO THE ER TO BE SEEN.

## 2024-09-25 NOTE — TELEPHONE ENCOUNTER
Patients wife called asking to resend the forms because patients job has not received anything. Forms were faxed to [phone] and confirmation was received. Scanned into chart

## 2024-10-09 ENCOUNTER — OFFICE VISIT (OUTPATIENT)
Dept: INTERNAL MEDICINE CLINIC | Facility: CLINIC | Age: 44
End: 2024-10-09

## 2024-10-09 VITALS
TEMPERATURE: 98.6 F | HEIGHT: 69 IN | HEART RATE: 76 BPM | WEIGHT: 243 LBS | BODY MASS INDEX: 35.99 KG/M2 | SYSTOLIC BLOOD PRESSURE: 136 MMHG | DIASTOLIC BLOOD PRESSURE: 83 MMHG

## 2024-10-09 DIAGNOSIS — M43.07 SPONDYLOLYSIS OF LUMBOSACRAL REGION: Primary | ICD-10-CM

## 2024-10-09 DIAGNOSIS — Z13.9 SCREENING DUE: ICD-10-CM

## 2024-10-09 DIAGNOSIS — Z12.11 SCREENING FOR COLORECTAL CANCER: ICD-10-CM

## 2024-10-09 DIAGNOSIS — M62.838 NECK MUSCLE SPASM: ICD-10-CM

## 2024-10-09 DIAGNOSIS — E55.9 VITAMIN D DEFICIENCY: ICD-10-CM

## 2024-10-09 DIAGNOSIS — G62.9 NEUROPATHY: ICD-10-CM

## 2024-10-09 DIAGNOSIS — Z12.12 SCREENING FOR COLORECTAL CANCER: ICD-10-CM

## 2024-10-09 DIAGNOSIS — M54.10 RADICULITIS: ICD-10-CM

## 2024-10-09 DIAGNOSIS — E78.5 HYPERLIPIDEMIA, UNSPECIFIED HYPERLIPIDEMIA TYPE: ICD-10-CM

## 2024-10-09 DIAGNOSIS — E53.8 B12 DEFICIENCY: ICD-10-CM

## 2024-10-09 DIAGNOSIS — G44.311 INTRACTABLE ACUTE POST-TRAUMATIC HEADACHE: ICD-10-CM

## 2024-10-09 RX ORDER — LANOLIN ALCOHOL/MO/W.PET/CERES
1000 CREAM (GRAM) TOPICAL DAILY
Qty: 30 TABLET | Refills: 2 | Status: SHIPPED | OUTPATIENT
Start: 2024-10-09 | End: 2025-01-07

## 2024-10-09 RX ORDER — LANOLIN ALCOHOL/MO/W.PET/CERES
400 CREAM (GRAM) TOPICAL DAILY
Qty: 90 TABLET | Refills: 0 | Status: SHIPPED | OUTPATIENT
Start: 2024-10-09

## 2024-10-09 NOTE — PROGRESS NOTES
Ambulatory Visit  Name: Efra Machado      : 1980      MRN: [id number]  Encounter Provider: Christine Fernandez DO  Encounter Date: 10/9/2024   [facility]    Assessment & Plan  Spondylolysis of lumbosacral region  L5 on S1 spondylolisthesis noted on Lumbar MRI from May 2024.. There is associated moderate-severe neural foraminal narrowing of the L5 nerve roots. There is also mild foraminal narrowing at L3/L4 and L4/L5.  Patient saw neurosurgery in  which recommended conservative measures at this time but if unimproved can consider surgery.  Patient had an ER visit in August with lumbar radiculopathy symptoms and was discharged with Medrol Dosepak. On previous clinic visit last month patient had left dorsiflexion weakness as well as pain.  Patient reports his symptoms are significantly improved and does not have any weakness at this time.  Patient's pain is maintained on gabapentin 100 mg 3 times daily.  Will hold off on pain management referral at this time but if symptoms recur will refer to spine pain management.  He unfortunately cannot go to physical therapy due to cost.  Will have him follow-up in 4 months to assess symptoms and annual physical.      Intractable acute post-traumatic headache  Patient was prescribed verapamil and sumatriptan for his headaches which he was not taking at prior visit last month.  He reports taking verapamil 80 mg 3 times daily consistently which has significantly helped his headaches and is less frequent occurring about once a week.  He is not taking the sumatriptan at this time       Screening for colorectal cancer  Patient has no family history of malignancy besides a cousin with gastric cancer.  He has no 45-year-old and due for colorectal cancer screening.  Orders:    Ambulatory Referral to Gastroenterology; Future    Hyperlipidemia, unspecified hyperlipidemia type  Last lipid panel from 2024  revealed total cholesterol 191, TG 81, HDL 47, .  Patient was inconsistent with his pravastatin use at that time.  Coronary calcium score done in February 2024 revealed score of 10.  On last visit last month he was transition to atorvastatin 20 mg daily which she states he is taking it daily.  Orders:    Lipid Panel with Direct LDL reflex; Future    Screening due  Will order CBC CMP a lipid panel to be done 1 week prior to annual physical in 4 months  Orders:    Lipid Panel with Direct LDL reflex; Future    CBC and differential; Future    Comprehensive metabolic panel; Future    Neck muscle spasm  Patient was seen by neurology in June and was given magnesium for muscle aches. Will refill.  Orders:    magnesium Oxide (MAG-OX) 400 mg TABS; Take 1 tablet (400 mg total) by mouth daily    Radiculitis    Orders:    magnesium Oxide (MAG-OX) 400 mg TABS; Take 1 tablet (400 mg total) by mouth daily    Neuropathy    Orders:    vitamin B-12 (VITAMIN B-12) 1,000 mcg tablet; Take 1 tablet (1,000 mcg total) by mouth daily    B12 deficiency    Orders:    vitamin B-12 (VITAMIN B-12) 1,000 mcg tablet; Take 1 tablet (1,000 mcg total) by mouth daily    Vitamin D deficiency    Orders:    Cholecalciferol (VITAMIN D3) 1,000 units tablet; Take 1 tablet (1,000 Units total) by mouth daily    [unfilled]  History of Present Illness      44-year-old male with history of lumbar spondylisthesis, headache, hypertension, class I obesity, former smoker here for 1 month follow up on lumbar radiculopathy symptoms. L5 on S1 spondylolisthesis noted on Lumbar MRI from May 2024.. There is associated moderate-severe neural foraminal narrowing of the L5 nerve roots. There is also mild foraminal narrowing at L3/L4 and L4/L5.  Patient saw neurosurgery in June which recommended conservative measures at this time but if unimproved can consider surgery.  Patient had an ER visit in August with lumbar radiculopathy symptoms and was discharged with  Medrol Dosepak. On previous clinic visit last month patient had left dorsiflexion weakness as well as pain.  Patient reports his symptoms are significantly improved and does not have any weakness at this time.  Patient's pain is maintained on gabapentin 100 mg 3 times daily.  Will hold off on pain management referral at this time but if symptoms recur will refer to spine pain management.  He unfortunately cannot go to physical therapy due to cost.  Will have him follow-up in 4 months to assess symptoms and annual physical. Will order CBC CMP a lipid panel to be done 1 week prior to annual physical in 4 months    Patient was prescribed verapamil and sumatriptan for his headaches which he was not taking at prior visit last month.  He reports taking verapamil 80 mg 3 times daily consistently which has significantly helped his headaches and is less frequent occurring about once a week.  He is not taking the sumatriptan at this time. Patient was seen by neurology in June and was given magnesium for muscle aches. Will refill.    Last lipid panel from February 2024 revealed total cholesterol 191, TG 81, HDL 47, .  Patient was inconsistent with his pravastatin use at that time.  Coronary calcium score done in February 2024 revealed score of 10.  On last visit last month he was transition to atorvastatin 20 mg daily which she states he is taking it daily.          Review of Systems   Constitutional:  Negative for chills and fever.   HENT:  Negative for ear pain and sore throat.    Eyes:  Negative for pain and visual disturbance.   Respiratory:  Negative for cough and shortness of breath.    Cardiovascular:  Negative for chest pain and palpitations.   Gastrointestinal:  Negative for abdominal pain and vomiting.   Genitourinary:  Negative for dysuria and hematuria.   Musculoskeletal:  Negative for arthralgias and back pain.   Skin:  Negative for color change and rash.   Neurological:  Positive for headaches. Negative  "for seizures and syncope.        Headaches less frequent occurring now at once a week   All other systems reviewed and are negative.          Objective     /83 (BP Location: Right arm, Patient Position: Sitting, Cuff Size: Large)   Pulse 76   Temp 98.6 °F (37 °C) (Temporal)   Ht 5' 9\" (1.753 m)   Wt 110 kg (243 lb)   BMI 35.88 kg/m²     Physical Exam  Vitals and nursing note reviewed.   Constitutional:       General: He is not in acute distress.     Appearance: He is well-developed.   HENT:      Head: Normocephalic and atraumatic.      Mouth/Throat:      Pharynx: Oropharynx is clear.   Eyes:      Conjunctiva/sclera: Conjunctivae normal.   Cardiovascular:      Rate and Rhythm: Normal rate and regular rhythm.      Heart sounds: No murmur heard.  Pulmonary:      Effort: Pulmonary effort is normal. No respiratory distress.      Breath sounds: Normal breath sounds.   Abdominal:      Palpations: Abdomen is soft.      Tenderness: There is no abdominal tenderness.   Musculoskeletal:         General: No swelling.      Cervical back: Neck supple.   Skin:     General: Skin is warm and dry.      Capillary Refill: Capillary refill takes less than 2 seconds.   Neurological:      General: No focal deficit present.      Mental Status: He is alert and oriented to person, place, and time.      Sensory: No sensory deficit.      Motor: No weakness.      Gait: Gait normal.      Deep Tendon Reflexes: Reflexes normal.      Comments: No dorsiflexion weakness on exam. DTR intact in LE bilaterally.   Psychiatric:         Mood and Affect: Mood normal.           "

## 2024-10-10 PROBLEM — M43.07 SPONDYLOLYSIS OF LUMBOSACRAL REGION: Status: ACTIVE | Noted: 2024-10-10

## 2024-10-10 NOTE — ASSESSMENT & PLAN NOTE
Last lipid panel from February 2024 revealed total cholesterol 191, TG 81, HDL 47, .  Patient was inconsistent with his pravastatin use at that time.  Coronary calcium score done in February 2024 revealed score of 10.  On last visit last month he was transition to atorvastatin 20 mg daily which she states he is taking it daily.  Orders:    Lipid Panel with Direct LDL reflex; Future

## 2024-10-10 NOTE — ASSESSMENT & PLAN NOTE
L5 on S1 spondylolisthesis noted on Lumbar MRI from May 2024.. There is associated moderate-severe neural foraminal narrowing of the L5 nerve roots. There is also mild foraminal narrowing at L3/L4 and L4/L5.  Patient saw neurosurgery in June which recommended conservative measures at this time but if unimproved can consider surgery.  Patient had an ER visit in August with lumbar radiculopathy symptoms and was discharged with Medrol Dosepak. On previous clinic visit last month patient had left dorsiflexion weakness as well as pain.  Patient reports his symptoms are significantly improved and does not have any weakness at this time.  Patient's pain is maintained on gabapentin 100 mg 3 times daily.  Will hold off on pain management referral at this time but if symptoms recur will refer to spine pain management.  He unfortunately cannot go to physical therapy due to cost.  Will have him follow-up in 4 months to assess symptoms and annual physical.

## 2024-10-10 NOTE — ASSESSMENT & PLAN NOTE
Patient was prescribed verapamil and sumatriptan for his headaches which he was not taking at prior visit last month.  He reports taking verapamil 80 mg 3 times daily consistently which has significantly helped his headaches and is less frequent occurring about once a week.  He is not taking the sumatriptan at this time

## 2024-11-19 ENCOUNTER — OFFICE VISIT (OUTPATIENT)
Dept: INTERNAL MEDICINE CLINIC | Facility: CLINIC | Age: 44
End: 2024-11-19

## 2024-11-19 VITALS
BODY MASS INDEX: 35.29 KG/M2 | TEMPERATURE: 98.1 F | DIASTOLIC BLOOD PRESSURE: 74 MMHG | HEART RATE: 71 BPM | WEIGHT: 239 LBS | SYSTOLIC BLOOD PRESSURE: 122 MMHG

## 2024-11-19 DIAGNOSIS — T15.91XA FOREIGN BODY OF RIGHT EYE, INITIAL ENCOUNTER: Primary | ICD-10-CM

## 2024-11-19 PROCEDURE — 99213 OFFICE O/P EST LOW 20 MIN: CPT | Performed by: FAMILY MEDICINE

## 2024-11-19 NOTE — ASSESSMENT & PLAN NOTE
See  detailed HPI , exam + foreign body he had been working on his car for much of the day on 11/17 he had no eye irritation or pain during those hours but later in the afternoon developed redness , tearing and pain , recommend local care warm compresses , ne needs ophthalmology evaluation soon as possible referral placed   Orders:    Ambulatory Referral to Ophthalmology; Future

## 2024-11-19 NOTE — PROGRESS NOTES
Name: Efra Machado      : 1980      MRN: [id number]  Encounter Provider: Aga Longoria MD  Encounter Date: 2024   [facility]    Assessment & Plan  Foreign body of right eye, initial encounter  See  detailed HPI , exam + foreign body he had been working on his car for much of the day on  he had no eye irritation or pain during those hours but later in the afternoon developed redness , tearing and pain , recommend local care warm compresses , ne needs ophthalmology evaluation soon as possible referral placed   Orders:    Ambulatory Referral to Ophthalmology; Future    BMI Counseling: Body mass index is 35.29 kg/m². The BMI is above normal. Nutrition recommendations include decreasing portion sizes, encouraging healthy choices of fruits and vegetables, decreasing fast food intake, consuming healthier snacks, limiting drinks that contain sugar and reducing intake of saturated and trans fat. Exercise recommendations include exercising 3-5 times per week. Rationale for BMI follow-up plan is due to patient being overweight or obese.         History of Present Illness     HPI Pt here with Daughter acting as interpretor , he ha been having eye redness itchy , drainage x 3 days , first noted some pain , redness itching yesterday he had drainage , this morning he had R eye pasted shut , no contact lens use   Pt other Daughter had a cold last week , no eye sx , pt had been noting blurry vision in R eye x years   Pt was working under his car on  all day he never noted anything wrong with the eye until later in the day    Last eye exam this year , he does wear glasses prescription did change he lost script so hasn't been able to get them yet , he doesn't have seasonal allergies or associated eye issues   Review of Systems   Constitutional:  Negative for chills and fever.   HENT:  Negative for ear pain and sore throat.    Eyes:   Positive for discharge, redness and itching. Negative for photophobia, pain and visual disturbance.        R eye , blurry vision chronic worse since     Respiratory:  Negative for cough and shortness of breath.    Cardiovascular:  Negative for chest pain and palpitations.   Gastrointestinal:  Negative for abdominal pain and vomiting.   Genitourinary:  Negative for dysuria and hematuria.   Musculoskeletal:  Negative for arthralgias and back pain.   Skin:  Negative for color change and rash.   Allergic/Immunologic: Negative for environmental allergies and food allergies.   Neurological:  Negative for seizures, syncope and headaches.   All other systems reviewed and are negative.    Past Medical History:   Diagnosis Date    Closed fracture of left distal radius 2023    Cluster headache     Concussion     Concussion with no loss of consciousness 2018    History of 2019 novel coronavirus disease (COVID-19) 2020    Hypertension     Neurogenic claudication     VENKAT (obstructive sleep apnea)     Stress fracture of right ankle 2022    Vitamin D deficiency      History reviewed. No pertinent surgical history.  Family History   Problem Relation Age of Onset    Hypertension Mother     No Known Problems Father     No Known Problems Sister     No Known Problems Brother     No Known Problems Son     No Known Problems Daughter      Social History     Tobacco Use    Smoking status: Former     Current packs/day: 0.00     Average packs/day: 1 pack/day for 15.0 years (15.0 ttl pk-yrs)     Types: Cigarettes     Start date:      Quit date: 2017     Years since quittin.8    Smokeless tobacco: Never   Vaping Use    Vaping status: Never Used   Substance and Sexual Activity    Alcohol use: Not Currently     Comment: weekends only and half a pack    Drug use: No    Sexual activity: Yes     Partners: Female     Current Outpatient Medications on File Prior to Visit   Medication Sig    acetaminophen (TYLENOL)  500 mg tablet Take 1 tablet (500 mg total) by mouth every 6 (six) hours as needed for mild pain (Patient not taking: Reported on 6/24/2024)    atorvastatin (LIPITOR) 20 mg tablet Take 1 tablet (20 mg total) by mouth daily    Cholecalciferol (VITAMIN D3) 1,000 units tablet Take 1 tablet (1,000 Units total) by mouth daily    gabapentin (NEURONTIN) 100 mg capsule Take 1 capsule (100 mg total) by mouth 3 (three) times a day    magnesium Oxide (MAG-OX) 400 mg TABS Take 1 tablet (400 mg total) by mouth daily    methylPREDNISolone 4 MG tablet therapy pack Use as directed on package    SUMAtriptan (IMITREX) 20 MG/ACT nasal spray 1 spray (20 mg total) into each nostril once as needed for migraine for up to 6 doses ; if headache returns/persists, may repeat dose once after 2 hours; MAX 40 mg/24 hours (Patient not taking: Reported on 6/24/2024)    verapamil (CALAN) 80 mg tablet Take 1 tablet (80 mg total) by mouth 3 (three) times a day    vitamin B-12 (VITAMIN B-12) 1,000 mcg tablet Take 1 tablet (1,000 mcg total) by mouth daily     No Known Allergies    There is no immunization history on file for this patient.  Objective   /74 (BP Location: Left arm, Patient Position: Sitting, Cuff Size: Large)   Pulse 71   Temp 98.1 °F (36.7 °C) (Temporal)   Wt 108 kg (239 lb)   BMI 35.29 kg/m²     Physical Exam  Vitals and nursing note reviewed.   Constitutional:       General: He is not in acute distress.     Appearance: He is well-developed.      Comments: Skin with good color turgor , well hydrated ,no distress noted     HENT:      Head: Normocephalic and atraumatic.      Right Ear: No decreased hearing noted. No middle ear effusion. There is no impacted cerumen.      Left Ear: No decreased hearing noted.  No middle ear effusion. There is no impacted cerumen.      Mouth/Throat:      Pharynx: Oropharynx is clear.   Eyes:      General: Lids are normal. No scleral icterus.        Right eye: Foreign body present. No discharge or  hordeolum.      Extraocular Movements: Extraocular movements intact.      Conjunctiva/sclera:      Right eye: Right conjunctiva is injected. No exudate.     Comments: + foreign body mid R eye , ? Metal    Neck:      Thyroid: No thyromegaly.   Cardiovascular:      Rate and Rhythm: Normal rate and regular rhythm.      Heart sounds: Normal heart sounds. No murmur heard.  Pulmonary:      Effort: Pulmonary effort is normal. No respiratory distress.      Breath sounds: Normal breath sounds.   Abdominal:      Palpations: Abdomen is soft.      Tenderness: There is no abdominal tenderness.   Musculoskeletal:         General: No swelling.      Cervical back: Neck supple.   Lymphadenopathy:      Cervical:      Right cervical: No superficial cervical adenopathy.     Left cervical: No superficial cervical adenopathy.   Skin:     General: Skin is warm and dry.      Capillary Refill: Capillary refill takes less than 2 seconds.   Neurological:      Mental Status: He is alert.      Comments: Non focal exam    Psychiatric:         Attention and Perception: Attention normal.         Mood and Affect: Mood normal.         Speech: Speech normal.         Behavior: Behavior normal.

## 2024-11-21 ENCOUNTER — HOSPITAL ENCOUNTER (EMERGENCY)
Facility: HOSPITAL | Age: 44
Discharge: HOME | End: 2024-11-21
Attending: EMERGENCY MEDICINE | Admitting: EMERGENCY MEDICINE
Payer: COMMERCIAL

## 2024-11-21 VITALS
RESPIRATION RATE: 18 BRPM | BODY MASS INDEX: 36.04 KG/M2 | SYSTOLIC BLOOD PRESSURE: 157 MMHG | DIASTOLIC BLOOD PRESSURE: 86 MMHG | WEIGHT: 244.05 LBS | OXYGEN SATURATION: 95 % | HEART RATE: 81 BPM | TEMPERATURE: 98.6 F

## 2024-11-21 DIAGNOSIS — T15.91XD FOREIGN BODY OF RIGHT EYE, SUBSEQUENT ENCOUNTER: Primary | ICD-10-CM

## 2024-11-21 PROCEDURE — 99284 EMERGENCY DEPT VISIT MOD MDM: CPT | Performed by: EMERGENCY MEDICINE

## 2024-11-21 PROCEDURE — 99282 EMERGENCY DEPT VISIT SF MDM: CPT

## 2024-11-21 PROCEDURE — 65222 REMOVE FOREIGN BODY FROM EYE: CPT | Performed by: EMERGENCY MEDICINE

## 2024-11-21 RX ORDER — TETRACAINE HYDROCHLORIDE 5 MG/ML
2 SOLUTION OPHTHALMIC ONCE
Status: COMPLETED | OUTPATIENT
Start: 2024-11-21 | End: 2024-11-21

## 2024-11-21 RX ORDER — IBUPROFEN 400 MG/1
400 TABLET, FILM COATED ORAL ONCE
Status: COMPLETED | OUTPATIENT
Start: 2024-11-21 | End: 2024-11-21

## 2024-11-21 RX ORDER — ERYTHROMYCIN 5 MG/G
OINTMENT OPHTHALMIC
Qty: 1 G | Refills: 0 | Status: SHIPPED | OUTPATIENT
Start: 2024-11-21

## 2024-11-21 RX ORDER — ACETAMINOPHEN 325 MG/1
975 TABLET ORAL ONCE
Status: COMPLETED | OUTPATIENT
Start: 2024-11-21 | End: 2024-11-21

## 2024-11-21 RX ORDER — ERYTHROMYCIN 5 MG/G
0.5 OINTMENT OPHTHALMIC ONCE
Status: COMPLETED | OUTPATIENT
Start: 2024-11-21 | End: 2024-11-21

## 2024-11-21 RX ADMIN — ACETAMINOPHEN 975 MG: 325 TABLET, FILM COATED ORAL at 19:43

## 2024-11-21 RX ADMIN — TETRACAINE HYDROCHLORIDE 2 DROP: 5 SOLUTION OPHTHALMIC at 18:47

## 2024-11-21 RX ADMIN — ERYTHROMYCIN 0.5 INCH: 5 OINTMENT OPHTHALMIC at 19:39

## 2024-11-21 RX ADMIN — IBUPROFEN 400 MG: 400 TABLET, FILM COATED ORAL at 19:43

## 2024-11-21 NOTE — ED PROVIDER NOTES
Time reflects when diagnosis was documented in both MDM as applicable and the Disposition within this note       Time User Action Codes Description Comment    11/21/2024  7:22 PM Deandradineshnickie Andrea Add [T15.91XA] Foreign body of right eye, initial encounter     11/21/2024  7:23 PM DeandradineshAndrea fu Modify [T15.91XA] Foreign body of right eye, initial encounter     11/21/2024  7:23 PM Deandranancie Andrea Add [T15.91XD] Foreign body of right eye, subsequent encounter     11/21/2024  7:23 PM Deandranancie Andrea Remove [T15.91XA] Foreign body of right eye, initial encounter           ED Disposition       ED Disposition   Discharge    Condition   Stable    Date/Time   Thu Nov 21, 2024  7:22 PM    Comment   Efra Machado discharge to home/self care.                   Assessment & Plan       Medical Decision Making  Patient is a 44-year-old male presenting with right eye pain and concern for foreign body.    Differential includes but not limited to retained foreign body, corneal abrasion, corneal ulcer, acute angle-closure glaucoma, uveitis.  Visual and slit-lamp exam shows retained foreign body.  Fluorescein staining was not done due to obvious corneal defect as well as foreign body.  Foreign body was attempted to be removed, but is difficult to tell if it has all been removed due to the dense rust ring.  Patient was given erythromycin ointment.  [facility] for sight was called and person on triage line stated that she would let them know to get the patient in tomorrow.    Patient cleared for discharge with ophthalmology follow-up and return precautions.    Risk  OTC drugs.  Prescription drug management.             Medications   tetracaine 0.5 % ophthalmic solution 2 drop (2 drops Left Eye Given 11/21/24 1847)   erythromycin (ILOTYCIN) 0.5 % ophthalmic ointment 0.5 inch (0.5 inches Right Eye Given 11/21/24 1939)   acetaminophen (TYLENOL) tablet 975 mg (975 mg Oral Given 11/21/24 1943)   ibuprofen (MOTRIN)  tablet 400 mg (400 mg Oral Given 24)       ED Risk Strat Scores                                               History of Present Illness       Chief Complaint   Patient presents with    Foreign Body in Eye     Pt was working on car , believe piece of metal is in eye, went to PCP and nothing was done for pt.  Reports trying to wash out eye with hot water.       Past Medical History:   Diagnosis Date    Closed fracture of left distal radius 2023    Cluster headache     Concussion     Concussion with no loss of consciousness 2018    History of 2019 novel coronavirus disease (COVID-19) 2020    Hypertension     Neurogenic claudication     VENKAT (obstructive sleep apnea)     Stress fracture of right ankle 2022    Vitamin D deficiency       History reviewed. No pertinent surgical history.   Family History   Problem Relation Age of Onset    Hypertension Mother     No Known Problems Father     No Known Problems Sister     No Known Problems Brother     No Known Problems Son     No Known Problems Daughter       Social History     Tobacco Use    Smoking status: Former     Current packs/day: 0.00     Average packs/day: 1 pack/day for 15.0 years (15.0 ttl pk-yrs)     Types: Cigarettes     Start date:      Quit date: 2017     Years since quittin.8    Smokeless tobacco: Never   Vaping Use    Vaping status: Never Used   Substance Use Topics    Alcohol use: Not Currently     Comment: weekends only and half a pack    Drug use: No      E-Cigarette/Vaping    E-Cigarette Use Never User       E-Cigarette/Vaping Substances    Nicotine No     THC No     CBD No     Flavoring No     Other No     Unknown No       I have reviewed and agree with the history as documented.     Patient is a 44-year-old male with past medical history of headaches, hypertension, hyperlipidemia, spondylosis of lumbosacral region, and recent visit for foreign body of right eye presenting for concern of foreign body of  right eye.  Patient was working on his car 4 days ago and felt like he got something in his eye.  His eye has been red and painful and he has had some blurriness of vision.  He has also had photophobia.  Patient's symptoms have not been improving.  He went to primary care visit and they referred him to ophthalmology but he had not yet followed up.  He denies any other symptoms at this time.  He states he has never had an issue with this before.        Review of Systems        Objective       ED Triage Vitals   Temperature Pulse Blood Pressure Respirations SpO2 Patient Position - Orthostatic VS   11/21/24 1745 11/21/24 1745 11/21/24 1747 11/21/24 1745 11/21/24 1745 11/21/24 1747   98.6 °F (37 °C) 81 157/86 18 95 % Sitting      Temp Source Heart Rate Source BP Location FiO2 (%) Pain Score    11/21/24 1745 11/21/24 1745 11/21/24 1747 -- 11/21/24 1943    Oral Monitor Right arm  8      Vitals      Date and Time Temp Pulse SpO2 Resp BP Pain Score FACES Pain Rating User   11/21/24 1943 -- -- -- -- -- 8 -- JR   11/21/24 1747 -- -- -- -- 157/86 -- -- MF   11/21/24 1745 98.6 °F (37 °C) 81 95 % 18 -- -- --             Physical Exam  Vitals and nursing note reviewed.   Constitutional:       General: He is not in acute distress.     Appearance: Normal appearance. He is not ill-appearing.      Comments: Uncomfortable, covering his eye   HENT:      Head: Normocephalic and atraumatic.   Eyes:      General: Gaze aligned appropriately.         Right eye: Foreign body present.      Extraocular Movements: Extraocular movements intact.      Conjunctiva/sclera:      Right eye: Right conjunctiva is injected.      Pupils: Pupils are equal, round, and reactive to light.      Slit lamp exam:     Right eye: Foreign body present.     Cardiovascular:      Rate and Rhythm: Normal rate and regular rhythm.   Pulmonary:      Effort: Pulmonary effort is normal.      Breath sounds: Normal breath sounds.   Musculoskeletal:      Cervical back: Normal  range of motion and neck supple.   Skin:     General: Skin is warm and dry.   Neurological:      General: No focal deficit present.      Mental Status: He is alert and oriented to person, place, and time.      Cranial Nerves: No cranial nerve deficit.      Sensory: No sensory deficit.      Motor: No weakness.         Results Reviewed       None            No orders to display       Foreign Body - Ocular    Date/Time: 11/21/2024 9:34 PM    Performed by: Andrea Evangelista MD  Authorized by: Andrea Evangelista MD    Patient location:  ED  Other Assisting Provider: Yes (comment) (Dr. Heredia)    Consent:     Consent obtained:  Verbal    Consent given by:  Patient    Risks discussed:  Corneal damage, globe perforation, pain, incomplete removal, visual impairment, bleeding, damage to surrounding structures, infection and worsening of condition    Alternatives discussed:  No treatment, delayed treatment, alternative treatment, observation and referral  Universal protocol:     Procedure explained and questions answered to patient or proxy's satisfaction: yes      Immediately prior to procedure, a time out was called: yes      Patient identity confirmed:  Verbally with patient  Location:     Location:  [facility]    Depth:  Embedded  Pre-procedure details:     Imaging:  None    Fluorescein exam: no    Anesthesia (see MAR for exact dosages):     Local anesthetic:  Tetracaine drops  Procedure details:     Localization method:  Direct visualization and slit lamp    Removal mechanism:  Irrigation and moist cotton swab    Foreign bodies recovered:  1    Description:  Small metallic foreign body, difficult to tell if it was all removed.    Residual rust ring observed: yes      Residual rust ring removed: no    Post-procedure details:     Post-procedure assessment: Difficult to assess if there is foreign body retained versus dense rust ring.    Dressing:  Antibiotic ointment    Patient tolerance of procedure:  Tolerated  well, no immediate complications      ED Medication and Procedure Management   Prior to Admission Medications   Prescriptions Last Dose Informant Patient Reported? Taking?   Cholecalciferol (VITAMIN D3) 1,000 units tablet   No No   Sig: Take 1 tablet (1,000 Units total) by mouth daily   SUMAtriptan (IMITREX) 20 MG/ACT nasal spray  Self No No   Si spray (20 mg total) into each nostril once as needed for migraine for up to 6 doses ; if headache returns/persists, may repeat dose once after 2 hours; MAX 40 mg/24 hours   Patient not taking: Reported on 2024   acetaminophen (TYLENOL) 500 mg tablet  Self No No   Sig: Take 1 tablet (500 mg total) by mouth every 6 (six) hours as needed for mild pain   Patient not taking: Reported on 2024   atorvastatin (LIPITOR) 20 mg tablet   No No   Sig: Take 1 tablet (20 mg total) by mouth daily   gabapentin (NEURONTIN) 100 mg capsule   No No   Sig: Take 1 capsule (100 mg total) by mouth 3 (three) times a day   magnesium Oxide (MAG-OX) 400 mg TABS   No No   Sig: Take 1 tablet (400 mg total) by mouth daily   methylPREDNISolone 4 MG tablet therapy pack   No No   Sig: Use as directed on package   verapamil (CALAN) 80 mg tablet   No No   Sig: Take 1 tablet (80 mg total) by mouth 3 (three) times a day   vitamin B-12 (VITAMIN B-12) 1,000 mcg tablet   No No   Sig: Take 1 tablet (1,000 mcg total) by mouth daily      Facility-Administered Medications: None     Discharge Medication List as of 2024  7:35 PM        START taking these medications    Details   erythromycin (ILOTYCIN) ophthalmic ointment Place a 1/2 inch ribbon of ointment into the lower eyelid., Normal           CONTINUE these medications which have NOT CHANGED    Details   acetaminophen (TYLENOL) 500 mg tablet Take 1 tablet (500 mg total) by mouth every 6 (six) hours as needed for mild pain, Starting Tue 2024, Normal      atorvastatin (LIPITOR) 20 mg tablet Take 1 tablet (20 mg total) by mouth daily, Starting  Wed 9/18/2024, Normal      Cholecalciferol (VITAMIN D3) 1,000 units tablet Take 1 tablet (1,000 Units total) by mouth daily, Starting Wed 10/9/2024, Normal      gabapentin (NEURONTIN) 100 mg capsule Take 1 capsule (100 mg total) by mouth 3 (three) times a day, Starting Wed 9/18/2024, Until Mon 3/17/2025, Normal      magnesium Oxide (MAG-OX) 400 mg TABS Take 1 tablet (400 mg total) by mouth daily, Starting Wed 10/9/2024, Normal      methylPREDNISolone 4 MG tablet therapy pack Use as directed on package, Normal      SUMAtriptan (IMITREX) 20 MG/ACT nasal spray 1 spray (20 mg total) into each nostril once as needed for migraine for up to 6 doses ; if headache returns/persists, may repeat dose once after 2 hours; MAX 40 mg/24 hours, Starting Wed 6/5/2024, Normal      verapamil (CALAN) 80 mg tablet Take 1 tablet (80 mg total) by mouth 3 (three) times a day, Starting Wed 9/18/2024, Until Sun 11/17/2024, Normal      vitamin B-12 (VITAMIN B-12) 1,000 mcg tablet Take 1 tablet (1,000 mcg total) by mouth daily, Starting Wed 10/9/2024, Until Tue 1/7/2025, Normal             ED SEPSIS DOCUMENTATION   Time reflects when diagnosis was documented in both MDM as applicable and the Disposition within this note       Time User Action Codes Description Comment    11/21/2024  7:22 PM Andrea Evangelista Add [T15.91XA] Foreign body of right eye, initial encounter     11/21/2024  7:23 PM Andrea Evangelista Modify [T15.91XA] Foreign body of right eye, initial encounter     11/21/2024  7:23 PM Andrea Evangelista Add [T15.91XD] Foreign body of right eye, subsequent encounter     11/21/2024  7:23 PM Andrea Evangelista Remove [T15.91XA] Foreign body of right eye, initial encounter                  Andrea Evangelista MD  11/21/24 6184

## 2024-11-21 NOTE — Clinical Note
Efra Machado was seen and treated in our emergency department on 11/21/2024.        Other - See Comments        Diagnosis:     Efra  .    He may return on this date:     Patient not cleared for work until cleared by ophthalmology.     If you have any questions or concerns, please don't hesitate to call.      Andrea Evangelista MD    ______________________________           _______________          _______________  Hospital Representative                              Date                                Time

## 2024-11-22 NOTE — DISCHARGE INSTRUCTIONS
Please follow-up with ophthalmology-a referral was made and the phone numbers included.  Please call them in the morning.  Please return to the ED with new or worsening symptoms-see attached.  Please use antibiotic ointment as prescribed.

## 2025-02-12 ENCOUNTER — OFFICE VISIT (OUTPATIENT)
Dept: INTERNAL MEDICINE CLINIC | Facility: CLINIC | Age: 45
End: 2025-02-12

## 2025-02-12 VITALS
WEIGHT: 241.8 LBS | OXYGEN SATURATION: 98 % | TEMPERATURE: 97.5 F | DIASTOLIC BLOOD PRESSURE: 69 MMHG | HEIGHT: 71 IN | HEART RATE: 63 BPM | BODY MASS INDEX: 33.85 KG/M2 | SYSTOLIC BLOOD PRESSURE: 125 MMHG

## 2025-02-12 DIAGNOSIS — Z12.12 SCREENING FOR COLORECTAL CANCER: ICD-10-CM

## 2025-02-12 DIAGNOSIS — E78.5 HYPERLIPIDEMIA, UNSPECIFIED HYPERLIPIDEMIA TYPE: ICD-10-CM

## 2025-02-12 DIAGNOSIS — Z00.00 ANNUAL PHYSICAL EXAM: Primary | ICD-10-CM

## 2025-02-12 DIAGNOSIS — G62.9 NEUROPATHY: ICD-10-CM

## 2025-02-12 DIAGNOSIS — M54.10 RADICULITIS: ICD-10-CM

## 2025-02-12 DIAGNOSIS — Z12.11 SCREENING FOR COLORECTAL CANCER: ICD-10-CM

## 2025-02-12 DIAGNOSIS — E53.8 B12 DEFICIENCY: ICD-10-CM

## 2025-02-12 DIAGNOSIS — M62.838 NECK MUSCLE SPASM: ICD-10-CM

## 2025-02-12 DIAGNOSIS — Z59.9 FINANCIAL DIFFICULTIES: ICD-10-CM

## 2025-02-12 DIAGNOSIS — E55.9 VITAMIN D DEFICIENCY: ICD-10-CM

## 2025-02-12 DIAGNOSIS — R29.818 NEUROGENIC CLAUDICATION: ICD-10-CM

## 2025-02-12 PROCEDURE — 99386 PREV VISIT NEW AGE 40-64: CPT | Performed by: STUDENT IN AN ORGANIZED HEALTH CARE EDUCATION/TRAINING PROGRAM

## 2025-02-12 RX ORDER — LANOLIN ALCOHOL/MO/W.PET/CERES
1000 CREAM (GRAM) TOPICAL DAILY
Qty: 30 TABLET | Refills: 2 | Status: SHIPPED | OUTPATIENT
Start: 2025-02-12 | End: 2025-05-13

## 2025-02-12 RX ORDER — GABAPENTIN 100 MG/1
100 CAPSULE ORAL 3 TIMES DAILY
Qty: 270 CAPSULE | Refills: 1 | Status: SHIPPED | OUTPATIENT
Start: 2025-02-12 | End: 2025-08-11

## 2025-02-12 RX ORDER — ATORVASTATIN CALCIUM 20 MG/1
20 TABLET, FILM COATED ORAL DAILY
Qty: 30 TABLET | Refills: 6 | Status: SHIPPED | OUTPATIENT
Start: 2025-02-12

## 2025-02-12 RX ORDER — LANOLIN ALCOHOL/MO/W.PET/CERES
400 CREAM (GRAM) TOPICAL DAILY
Qty: 90 TABLET | Refills: 0 | Status: SHIPPED | OUTPATIENT
Start: 2025-02-12

## 2025-02-12 SDOH — ECONOMIC STABILITY - INCOME SECURITY: PROBLEM RELATED TO HOUSING AND ECONOMIC CIRCUMSTANCES, UNSPECIFIED: Z59.9

## 2025-02-12 NOTE — ASSESSMENT & PLAN NOTE
Patient is not taking prescribed atorvastatin. Last LDL of 128 when last assessed 02/2024. Will recheck and patient instructed to take statin as prescribed   Orders:    Lipid Panel with Direct LDL reflex; Future    atorvastatin (LIPITOR) 20 mg tablet; Take 1 tablet (20 mg total) by mouth daily

## 2025-02-12 NOTE — PROGRESS NOTES
Adult Annual Physical  Name: Efra Machado      : 1980      MRN: [id number]  Encounter Provider: Rikki Wilson MD  Encounter Date: 2025   Encounter department: [facility]    Assessment & Plan  Annual physical exam    Orders:    Ambulatory Referral to Dentistry; Future    Ambulatory Referral to Optometry; Future    CBC and differential; Future    Hemoglobin A1C; Future    Screening for colorectal cancer  Explained necessity of colon cancer screening and offered multiple options including colonoscopy and FIT test. Patient does not want to pursue any screening at this time including FIT testing.        Hyperlipidemia, unspecified hyperlipidemia type  Patient is not taking prescribed atorvastatin. Last LDL of 128 when last assessed 2024. Will recheck and patient instructed to take statin as prescribed   Orders:    Lipid Panel with Direct LDL reflex; Future    atorvastatin (LIPITOR) 20 mg tablet; Take 1 tablet (20 mg total) by mouth daily    Vitamin D deficiency  Last checked Vit D of 9.4 - currently asymptomatic   Orders:    Cholecalciferol (VITAMIN D3) 1,000 units tablet; Take 1 tablet (1,000 Units total) by mouth daily    Neuropathy    Orders:    vitamin B-12 (VITAMIN B-12) 1,000 mcg tablet; Take 1 tablet (1,000 mcg total) by mouth daily    B12 deficiency    Orders:    vitamin B-12 (VITAMIN B-12) 1,000 mcg tablet; Take 1 tablet (1,000 mcg total) by mouth daily    Neurogenic claudication    Orders:    gabapentin (NEURONTIN) 100 mg capsule; Take 1 capsule (100 mg total) by mouth 3 (three) times a day    Immunizations and preventive care screenings were discussed with patient today. Appropriate education was printed on patient's after visit summary.        Counseling:  Dental Health: discussed importance of regular tooth brushing, flossing, and dental visits.  Exercise: the importance of regular exercise/physical activity was discussed. Recommend exercise 3-5  "times per week for at least 30 minutes.   Diet    BMI Counseling: Body mass index is 34.2 kg/m². The BMI is above normal. Nutrition recommendations include encouraging healthy choices of fruits and vegetables and moderation in carbohydrate intake. Exercise recommendations include exercising 3-5 times per week. No pharmacotherapy was ordered. Patient referred to PCP. Rationale for BMI follow-up plan is due to patient being overweight or obese.     Depression Screening and Follow-up Plan: Patient's depression screening was positive with a PHQ-2 score of 6. Their PHQ-9 score was 9.   Referral to behavioral health services. Patient has mild to moderate depression without SI/HI and no other confounding medical diagnoses that would act as stressors at this time. Medication offered but patient doesn't want to see them         History of Present Illness     Adult Annual Physical:  Patient presents for annual physical.     Diet and Physical Activity:  - Diet/Nutrition: poor diet and low fat diet.  - Exercise: walking and no formal exercise.    Depression Screening:  - PHQ-2 Score: 6  - PHQ-9 Score: 9    General Health:  - Sleep: 4-6 hours of sleep on average.  - Hearing: normal hearing bilateral ears.  - Vision: most recent eye exam > 1 year ago and wears glasses.  - Dental: no dental visits for > 1 year.     Health:    - Urinary symptoms: none.     Advanced Care Planning:  - Has an advanced directive?: no    - Has a durable medical POA?: no    - ACP document given to patient?: no      Review of Systems      Objective   /69 (BP Location: Right arm, Patient Position: Sitting, Cuff Size: Large)   Pulse 63   Temp 97.5 °F (36.4 °C) (Temporal)   Ht 5' 10.5\" (1.791 m)   Wt 110 kg (241 lb 12.8 oz)   SpO2 98%   BMI 34.20 kg/m²     Physical Exam    "

## 2025-02-28 ENCOUNTER — PATIENT OUTREACH (OUTPATIENT)
Dept: INTERNAL MEDICINE CLINIC | Facility: CLINIC | Age: 45
End: 2025-02-28

## 2025-03-04 ENCOUNTER — PATIENT OUTREACH (OUTPATIENT)
Dept: INTERNAL MEDICINE CLINIC | Facility: CLINIC | Age: 45
End: 2025-03-04

## 2025-03-04 NOTE — PROGRESS NOTES
SW has reached out to pt again via  ID # [id number] to assist pt with his SDOH referral for financial difficulties.  SW has left another message for pt to return SW call and has also sent a Out reach Letter via MY CHART.     SW to remain available for support and assistance when pt returns call.

## 2025-03-04 NOTE — LETTER
03/04/25    Estimado/a James Mcdonald un coordinador de la atención médica en [facility]  [address] [place] [address] [address]  [place] [address]  [phone]. Intentamos comunicarnos con usted por teléfono varias veces. Es importante que se comunique con nosotros al Dept: [phone] para que podamos ofrecerle ayuda con horacio necesidades de atención médica.     Atentamente.         Teressa Barroso

## 2025-03-20 ENCOUNTER — TELEPHONE (OUTPATIENT)
Age: 45
End: 2025-03-20

## 2025-03-20 NOTE — TELEPHONE ENCOUNTER
Contacted patient in regards to Routine Referral in attempts to verify patient's needs of services and add patient to proper wait list. LVM for patient to contact intake dept  in regards to routine referral at [phone]. 2nd attempt. Closing referral.

## 2025-05-22 ENCOUNTER — OFFICE VISIT (OUTPATIENT)
Dept: INTERNAL MEDICINE CLINIC | Facility: CLINIC | Age: 45
End: 2025-05-22

## 2025-05-22 VITALS
WEIGHT: 249 LBS | SYSTOLIC BLOOD PRESSURE: 137 MMHG | HEART RATE: 63 BPM | TEMPERATURE: 98 F | DIASTOLIC BLOOD PRESSURE: 87 MMHG | BODY MASS INDEX: 35.22 KG/M2 | OXYGEN SATURATION: 98 %

## 2025-05-22 DIAGNOSIS — R53.83 OTHER FATIGUE: ICD-10-CM

## 2025-05-22 DIAGNOSIS — Z12.12 SCREENING FOR COLORECTAL CANCER: ICD-10-CM

## 2025-05-22 DIAGNOSIS — H53.8 BLURRY VISION, BILATERAL: Primary | ICD-10-CM

## 2025-05-22 DIAGNOSIS — Z12.11 SCREENING FOR COLORECTAL CANCER: ICD-10-CM

## 2025-05-22 DIAGNOSIS — E55.9 VITAMIN D DEFICIENCY: ICD-10-CM

## 2025-05-22 NOTE — PROGRESS NOTES
Name: Efra Machado      : 1980      MRN: [id number]  Encounter Provider: Christine Fernandez DO  Encounter Date: 2025   Encounter department: [facility] [place]  :  Assessment & Plan  Blurry vision, bilateral  Has history of glasses with last vision exam in January. He reports he got a change in his prescription lenses at that time which improved his blurry vision. As of last month he reports that he's having blurry vision again that has been bothersome and is constant in nature.  He denies headaches, dizziness, lightheadedness, nausea, vomiting weakness, sensory deficit. Will refer to optometry for new eye exam and adjust glasses prescription as needed. He will call his optometrist as well  Orders:    Ambulatory Referral to Optometry; Future    Vitamin D deficiency  History of vitamin D deficiency last year at 9.7.  Will recheck as she is complaining of fatigue.  Will also send vitamin D 2000 IUs daily  Orders:    Cholecalciferol (VITAMIN D3) 1,000 units tablet; Take 2 tablets (2,000 Units total) by mouth daily    Vitamin D 25 hydroxy; Future    Other fatigue  Patient complaining of fatigue for the past 3 months.  He reports he wakes up early for work at 2:30 in the morning and feels like he is not getting enough sleep.  He also endorses snoring and daytime somnolence.  He had a sleep study 2022 that revealed mild sleep apnea was recommended CPAP which has never used.  Discussed with the patient the importance of treating sleep apnea patient is amenable for sleep medicine at this time.  Will add CMP, vitamin D to his blood work that was ordered in February which patient understands he will need to get done.  Discussed diet and lifestyle modifications to help improve sleep apnea symptoms  Orders:    Ambulatory Referral to Sleep Medicine; Future    Comprehensive metabolic panel; Future    Screening for colorectal cancer  Patient refused all forms of colorectal  cancer screening including FIT testing and Cologuard at last visit in February he continues to refuse today.              History of Present Illness   44-year-old male with past medical history of hyperlipidemia presenting for 4-month follow-up visit.  He has history of glasses with last vision exam in January. He reports he got a change in his prescription lenses at that time which improved his blurry vision. As of last month he reports that he's having blurry vision again that has been bothersome and is constant in nature.  He denies headaches, dizziness, lightheadedness, nausea, vomiting weakness, sensory deficit. Will refer to optometry for new eye exam and adjust glasses prescription as needed. He will call his optometrist as well.    Patient complaining of fatigue for the past 3 months.  He reports he wakes up early for work at 2:30 in the morning and feels like he is not getting enough sleep.  He also endorses snoring and daytime somnolence.  He had a sleep study November 2022 that revealed mild sleep apnea was recommended CPAP which has never used.  Discussed with the patient the importance of treating sleep apnea patient is amenable for sleep medicine at this time.  Will add CMP, vitamin D to his blood work that was ordered in February which patient understands he will need to get done.  Discussed diet and lifestyle modifications to help improve sleep apnea symptoms      Review of Systems   Constitutional:  Positive for fatigue. Negative for chills and fever.   HENT:  Negative for ear pain and sore throat.    Eyes:  Positive for visual disturbance. Negative for pain.   Respiratory:  Negative for cough and shortness of breath.    Cardiovascular:  Negative for chest pain and palpitations.   Gastrointestinal:  Negative for abdominal pain and vomiting.   Genitourinary:  Negative for dysuria and hematuria.   Musculoskeletal:  Negative for arthralgias and back pain.   Skin:  Negative for color change and rash.    Neurological:  Negative for seizures and syncope.   All other systems reviewed and are negative.      Objective   /87 (BP Location: Left arm, Patient Position: Sitting, Cuff Size: Large)   Pulse 63   Temp 98 °F (36.7 °C) (Temporal)   Wt 113 kg (249 lb)   SpO2 98%   BMI 35.22 kg/m²      Physical Exam  Vitals and nursing note reviewed.   Constitutional:       General: He is not in acute distress.     Appearance: He is well-developed. He is not ill-appearing.   HENT:      Head: Normocephalic and atraumatic.     Eyes:      Conjunctiva/sclera: Conjunctivae normal.       Cardiovascular:      Rate and Rhythm: Normal rate and regular rhythm.      Heart sounds: No murmur heard.  Pulmonary:      Effort: Pulmonary effort is normal. No respiratory distress.      Breath sounds: Normal breath sounds.   Abdominal:      General: Abdomen is flat. There is no distension.      Palpations: Abdomen is soft.      Tenderness: There is no abdominal tenderness.     Musculoskeletal:         General: No swelling or tenderness.      Cervical back: Neck supple.      Right lower leg: No edema.      Left lower leg: No edema.     Skin:     General: Skin is warm and dry.      Capillary Refill: Capillary refill takes less than 2 seconds.     Neurological:      General: No focal deficit present.      Mental Status: He is alert.     Psychiatric:         Mood and Affect: Mood normal.